# Patient Record
Sex: FEMALE | Race: BLACK OR AFRICAN AMERICAN | NOT HISPANIC OR LATINO | URBAN - METROPOLITAN AREA
[De-identification: names, ages, dates, MRNs, and addresses within clinical notes are randomized per-mention and may not be internally consistent; named-entity substitution may affect disease eponyms.]

---

## 2017-07-26 ENCOUNTER — OUTPATIENT (OUTPATIENT)
Dept: OUTPATIENT SERVICES | Facility: HOSPITAL | Age: 52
LOS: 1 days | End: 2017-07-26
Payer: COMMERCIAL

## 2017-07-26 VITALS
OXYGEN SATURATION: 98 % | TEMPERATURE: 98 F | WEIGHT: 218.92 LBS | HEIGHT: 62 IN | DIASTOLIC BLOOD PRESSURE: 80 MMHG | HEART RATE: 60 BPM | RESPIRATION RATE: 16 BRPM | SYSTOLIC BLOOD PRESSURE: 120 MMHG

## 2017-07-26 DIAGNOSIS — Z01.818 ENCOUNTER FOR OTHER PREPROCEDURAL EXAMINATION: ICD-10-CM

## 2017-07-26 DIAGNOSIS — Z98.890 OTHER SPECIFIED POSTPROCEDURAL STATES: Chronic | ICD-10-CM

## 2017-07-26 DIAGNOSIS — Z90.710 ACQUIRED ABSENCE OF BOTH CERVIX AND UTERUS: Chronic | ICD-10-CM

## 2017-07-26 DIAGNOSIS — M17.11 UNILATERAL PRIMARY OSTEOARTHRITIS, RIGHT KNEE: ICD-10-CM

## 2017-07-26 DIAGNOSIS — Z98.891 HISTORY OF UTERINE SCAR FROM PREVIOUS SURGERY: Chronic | ICD-10-CM

## 2017-07-26 LAB
ANION GAP SERPL CALC-SCNC: 9 MMOL/L — SIGNIFICANT CHANGE UP (ref 5–17)
APPEARANCE UR: CLEAR — SIGNIFICANT CHANGE UP
APTT BLD: 29.8 SEC — SIGNIFICANT CHANGE UP (ref 27.5–37.4)
BILIRUB UR-MCNC: NEGATIVE — SIGNIFICANT CHANGE UP
BUN SERPL-MCNC: 15 MG/DL — SIGNIFICANT CHANGE UP (ref 7–18)
CALCIUM SERPL-MCNC: 8.9 MG/DL — SIGNIFICANT CHANGE UP (ref 8.4–10.5)
CHLORIDE SERPL-SCNC: 101 MMOL/L — SIGNIFICANT CHANGE UP (ref 96–108)
CO2 SERPL-SCNC: 31 MMOL/L — SIGNIFICANT CHANGE UP (ref 22–31)
COLOR SPEC: YELLOW — SIGNIFICANT CHANGE UP
CREAT SERPL-MCNC: 0.83 MG/DL — SIGNIFICANT CHANGE UP (ref 0.5–1.3)
DIFF PNL FLD: NEGATIVE — SIGNIFICANT CHANGE UP
GLUCOSE SERPL-MCNC: 106 MG/DL — HIGH (ref 70–99)
GLUCOSE UR QL: NEGATIVE — SIGNIFICANT CHANGE UP
HCT VFR BLD CALC: 40.3 % — SIGNIFICANT CHANGE UP (ref 34.5–45)
HGB BLD-MCNC: 12.8 G/DL — SIGNIFICANT CHANGE UP (ref 11.5–15.5)
INR BLD: 1.04 RATIO — SIGNIFICANT CHANGE UP (ref 0.88–1.16)
KETONES UR-MCNC: NEGATIVE — SIGNIFICANT CHANGE UP
LEUKOCYTE ESTERASE UR-ACNC: ABNORMAL
MCHC RBC-ENTMCNC: 26.2 PG — LOW (ref 27–34)
MCHC RBC-ENTMCNC: 31.9 GM/DL — LOW (ref 32–36)
MCV RBC AUTO: 82.1 FL — SIGNIFICANT CHANGE UP (ref 80–100)
MRSA PCR RESULT.: SIGNIFICANT CHANGE UP
NITRITE UR-MCNC: NEGATIVE — SIGNIFICANT CHANGE UP
PH UR: 6.5 — SIGNIFICANT CHANGE UP (ref 5–8)
PLATELET # BLD AUTO: 208 K/UL — SIGNIFICANT CHANGE UP (ref 150–400)
POTASSIUM SERPL-MCNC: 3.2 MMOL/L — LOW (ref 3.5–5.3)
POTASSIUM SERPL-SCNC: 3.2 MMOL/L — LOW (ref 3.5–5.3)
PROT UR-MCNC: NEGATIVE — SIGNIFICANT CHANGE UP
PROTHROM AB SERPL-ACNC: 11.3 SEC — SIGNIFICANT CHANGE UP (ref 9.8–12.7)
RBC # BLD: 4.91 M/UL — SIGNIFICANT CHANGE UP (ref 3.8–5.2)
RBC # FLD: 13.5 % — SIGNIFICANT CHANGE UP (ref 10.3–14.5)
S AUREUS DNA NOSE QL NAA+PROBE: DETECTED
SODIUM SERPL-SCNC: 141 MMOL/L — SIGNIFICANT CHANGE UP (ref 135–145)
SP GR SPEC: 1.01 — SIGNIFICANT CHANGE UP (ref 1.01–1.02)
UROBILINOGEN FLD QL: 1
WBC # BLD: 4.7 K/UL — SIGNIFICANT CHANGE UP (ref 3.8–10.5)
WBC # FLD AUTO: 4.7 K/UL — SIGNIFICANT CHANGE UP (ref 3.8–10.5)

## 2017-07-26 PROCEDURE — 81001 URINALYSIS AUTO W/SCOPE: CPT

## 2017-07-26 PROCEDURE — 86900 BLOOD TYPING SEROLOGIC ABO: CPT

## 2017-07-26 PROCEDURE — 87640 STAPH A DNA AMP PROBE: CPT

## 2017-07-26 PROCEDURE — 86901 BLOOD TYPING SEROLOGIC RH(D): CPT

## 2017-07-26 PROCEDURE — 85610 PROTHROMBIN TIME: CPT

## 2017-07-26 PROCEDURE — 80048 BASIC METABOLIC PNL TOTAL CA: CPT

## 2017-07-26 PROCEDURE — 71046 X-RAY EXAM CHEST 2 VIEWS: CPT

## 2017-07-26 PROCEDURE — 85730 THROMBOPLASTIN TIME PARTIAL: CPT

## 2017-07-26 PROCEDURE — 93005 ELECTROCARDIOGRAM TRACING: CPT

## 2017-07-26 PROCEDURE — G0463: CPT

## 2017-07-26 PROCEDURE — 87641 MR-STAPH DNA AMP PROBE: CPT

## 2017-07-26 PROCEDURE — 85027 COMPLETE CBC AUTOMATED: CPT

## 2017-07-26 PROCEDURE — 86850 RBC ANTIBODY SCREEN: CPT

## 2017-07-26 PROCEDURE — 71020: CPT | Mod: 26

## 2017-07-26 RX ORDER — SODIUM CHLORIDE 9 MG/ML
3 INJECTION INTRAMUSCULAR; INTRAVENOUS; SUBCUTANEOUS EVERY 8 HOURS
Refills: 0 | Status: DISCONTINUED | OUTPATIENT
Start: 2017-08-07 | End: 2017-08-11

## 2017-07-26 NOTE — H&P PST ADULT - PROBLEM SELECTOR PLAN 1
Patient is scheduled for right total knee replacement on 8/7/17.Patient is at moderate risk for this surgery.

## 2017-07-26 NOTE — H&P PST ADULT - MUSCULOSKELETAL
details… detailed exam right knee/no joint erythema/no joint warmth/no calf tenderness/decreased ROM due to pain/joint swelling/diminished strength

## 2017-07-26 NOTE — H&P PST ADULT - LAB RESULTS AND INTERPRETATION
MSSA positive , Mupirocin ordered to pharmacy, pt called , pt will start August 2, 2017 afternoon in am - Surgeon, Dr. Holbrook aware , to notify  OR booking, Dr. Holbrook notify about K level 3.2, to repeat in AM of sx

## 2017-07-26 NOTE — H&P PST ADULT - HISTORY OF PRESENT ILLNESS
52 years old female presented with right knee pain, swelling, limited ROM, x 2003, s/p injury and later fall. Diagnosed with unilateral primary OA of right knee and is scheduled for right total knee replacement on 8/7/17.

## 2017-07-26 NOTE — H&P PST ADULT - PMH
Hypertension    Menorrhagia with regular cycle    Morbid obesity  BMI- 40  Primary osteoarthritis of right knee

## 2017-07-26 NOTE — H&P PST ADULT - NSANTHOSAYNRD_GEN_A_CORE
pt advised to f/u with pulmonology for assessment for LEN/No. LEN screening performed.  STOP BANG Legend: 0-2 = LOW Risk; 3-4 = INTERMEDIATE Risk; 5-8 = HIGH Risk

## 2017-08-02 RX ORDER — MUPIROCIN 20 MG/G
1 OINTMENT TOPICAL
Qty: 1 | Refills: 0
Start: 2017-08-02 | End: 2017-08-07

## 2017-08-06 ENCOUNTER — TRANSCRIPTION ENCOUNTER (OUTPATIENT)
Age: 52
End: 2017-08-06

## 2017-08-07 ENCOUNTER — INPATIENT (INPATIENT)
Facility: HOSPITAL | Age: 52
LOS: 3 days | Discharge: NURSING, REHAB OR LONG-TERM CARE | DRG: 470 | End: 2017-08-11
Attending: ORTHOPAEDIC SURGERY | Admitting: ORTHOPAEDIC SURGERY
Payer: COMMERCIAL

## 2017-08-07 VITALS
WEIGHT: 218.92 LBS | HEIGHT: 62 IN | DIASTOLIC BLOOD PRESSURE: 88 MMHG | OXYGEN SATURATION: 95 % | RESPIRATION RATE: 14 BRPM | SYSTOLIC BLOOD PRESSURE: 163 MMHG | TEMPERATURE: 98 F | HEART RATE: 69 BPM

## 2017-08-07 DIAGNOSIS — Z98.891 HISTORY OF UTERINE SCAR FROM PREVIOUS SURGERY: Chronic | ICD-10-CM

## 2017-08-07 DIAGNOSIS — Z98.890 OTHER SPECIFIED POSTPROCEDURAL STATES: Chronic | ICD-10-CM

## 2017-08-07 DIAGNOSIS — Z90.710 ACQUIRED ABSENCE OF BOTH CERVIX AND UTERUS: Chronic | ICD-10-CM

## 2017-08-07 DIAGNOSIS — M17.11 UNILATERAL PRIMARY OSTEOARTHRITIS, RIGHT KNEE: ICD-10-CM

## 2017-08-07 LAB
ANION GAP SERPL CALC-SCNC: 7 MMOL/L — SIGNIFICANT CHANGE UP (ref 5–17)
ANION GAP SERPL CALC-SCNC: 8 MMOL/L — SIGNIFICANT CHANGE UP (ref 5–17)
BUN SERPL-MCNC: 12 MG/DL — SIGNIFICANT CHANGE UP (ref 7–18)
BUN SERPL-MCNC: 14 MG/DL — SIGNIFICANT CHANGE UP (ref 7–18)
CALCIUM SERPL-MCNC: 8.5 MG/DL — SIGNIFICANT CHANGE UP (ref 8.4–10.5)
CALCIUM SERPL-MCNC: 8.9 MG/DL — SIGNIFICANT CHANGE UP (ref 8.4–10.5)
CHLORIDE SERPL-SCNC: 105 MMOL/L — SIGNIFICANT CHANGE UP (ref 96–108)
CHLORIDE SERPL-SCNC: 107 MMOL/L — SIGNIFICANT CHANGE UP (ref 96–108)
CO2 SERPL-SCNC: 27 MMOL/L — SIGNIFICANT CHANGE UP (ref 22–31)
CO2 SERPL-SCNC: 27 MMOL/L — SIGNIFICANT CHANGE UP (ref 22–31)
CREAT SERPL-MCNC: 0.85 MG/DL — SIGNIFICANT CHANGE UP (ref 0.5–1.3)
CREAT SERPL-MCNC: 0.89 MG/DL — SIGNIFICANT CHANGE UP (ref 0.5–1.3)
GLUCOSE SERPL-MCNC: 111 MG/DL — HIGH (ref 70–99)
GLUCOSE SERPL-MCNC: 134 MG/DL — HIGH (ref 70–99)
HCT VFR BLD CALC: 37.7 % — SIGNIFICANT CHANGE UP (ref 34.5–45)
HGB BLD-MCNC: 11.8 G/DL — SIGNIFICANT CHANGE UP (ref 11.5–15.5)
MCHC RBC-ENTMCNC: 26 PG — LOW (ref 27–34)
MCHC RBC-ENTMCNC: 31.3 GM/DL — LOW (ref 32–36)
MCV RBC AUTO: 83.3 FL — SIGNIFICANT CHANGE UP (ref 80–100)
PLATELET # BLD AUTO: 143 K/UL — LOW (ref 150–400)
POTASSIUM SERPL-MCNC: 3.2 MMOL/L — LOW (ref 3.5–5.3)
POTASSIUM SERPL-MCNC: 3.3 MMOL/L — LOW (ref 3.5–5.3)
POTASSIUM SERPL-SCNC: 3.2 MMOL/L — LOW (ref 3.5–5.3)
POTASSIUM SERPL-SCNC: 3.3 MMOL/L — LOW (ref 3.5–5.3)
RBC # BLD: 4.53 M/UL — SIGNIFICANT CHANGE UP (ref 3.8–5.2)
RBC # FLD: 13.9 % — SIGNIFICANT CHANGE UP (ref 10.3–14.5)
SODIUM SERPL-SCNC: 140 MMOL/L — SIGNIFICANT CHANGE UP (ref 135–145)
SODIUM SERPL-SCNC: 141 MMOL/L — SIGNIFICANT CHANGE UP (ref 135–145)
WBC # BLD: 7.2 K/UL — SIGNIFICANT CHANGE UP (ref 3.8–10.5)
WBC # FLD AUTO: 7.2 K/UL — SIGNIFICANT CHANGE UP (ref 3.8–10.5)

## 2017-08-07 PROCEDURE — 88305 TISSUE EXAM BY PATHOLOGIST: CPT | Mod: 26

## 2017-08-07 PROCEDURE — 73562 X-RAY EXAM OF KNEE 3: CPT | Mod: 26,RT

## 2017-08-07 PROCEDURE — 88311 DECALCIFY TISSUE: CPT | Mod: 26

## 2017-08-07 RX ORDER — ASPIRIN/CALCIUM CARB/MAGNESIUM 324 MG
325 TABLET ORAL
Refills: 0 | Status: DISCONTINUED | OUTPATIENT
Start: 2017-08-07 | End: 2017-08-07

## 2017-08-07 RX ORDER — OXYCODONE AND ACETAMINOPHEN 5; 325 MG/1; MG/1
1 TABLET ORAL EVERY 4 HOURS
Refills: 0 | Status: DISCONTINUED | OUTPATIENT
Start: 2017-08-07 | End: 2017-08-07

## 2017-08-07 RX ORDER — ONDANSETRON 8 MG/1
4 TABLET, FILM COATED ORAL EVERY 6 HOURS
Refills: 0 | Status: DISCONTINUED | OUTPATIENT
Start: 2017-08-07 | End: 2017-08-11

## 2017-08-07 RX ORDER — HYDROMORPHONE HYDROCHLORIDE 2 MG/ML
0.5 INJECTION INTRAMUSCULAR; INTRAVENOUS; SUBCUTANEOUS
Refills: 0 | Status: DISCONTINUED | OUTPATIENT
Start: 2017-08-07 | End: 2017-08-08

## 2017-08-07 RX ORDER — ENOXAPARIN SODIUM 100 MG/ML
30 INJECTION SUBCUTANEOUS EVERY 12 HOURS
Refills: 0 | Status: DISCONTINUED | OUTPATIENT
Start: 2017-08-07 | End: 2017-08-11

## 2017-08-07 RX ORDER — BUPIVACAINE 13.3 MG/ML
20 INJECTION, SUSPENSION, LIPOSOMAL INFILTRATION ONCE
Refills: 0 | Status: COMPLETED | OUTPATIENT
Start: 2017-08-07 | End: 2017-08-07

## 2017-08-07 RX ORDER — ASCORBIC ACID 60 MG
500 TABLET,CHEWABLE ORAL
Refills: 0 | Status: DISCONTINUED | OUTPATIENT
Start: 2017-08-07 | End: 2017-08-11

## 2017-08-07 RX ORDER — DOCUSATE SODIUM 100 MG
100 CAPSULE ORAL THREE TIMES A DAY
Refills: 0 | Status: DISCONTINUED | OUTPATIENT
Start: 2017-08-07 | End: 2017-08-11

## 2017-08-07 RX ORDER — CEFAZOLIN SODIUM 1 G
2000 VIAL (EA) INJECTION EVERY 8 HOURS
Refills: 0 | Status: COMPLETED | OUTPATIENT
Start: 2017-08-07 | End: 2017-08-07

## 2017-08-07 RX ORDER — HYDROCHLOROTHIAZIDE 25 MG
25 TABLET ORAL DAILY
Refills: 0 | Status: DISCONTINUED | OUTPATIENT
Start: 2017-08-07 | End: 2017-08-11

## 2017-08-07 RX ORDER — HYDROMORPHONE HYDROCHLORIDE 2 MG/ML
30 INJECTION INTRAMUSCULAR; INTRAVENOUS; SUBCUTANEOUS
Refills: 0 | Status: DISCONTINUED | OUTPATIENT
Start: 2017-08-07 | End: 2017-08-08

## 2017-08-07 RX ORDER — ONDANSETRON 8 MG/1
4 TABLET, FILM COATED ORAL EVERY 6 HOURS
Refills: 0 | Status: DISCONTINUED | OUTPATIENT
Start: 2017-08-07 | End: 2017-08-08

## 2017-08-07 RX ORDER — HYDROMORPHONE HYDROCHLORIDE 2 MG/ML
0.5 INJECTION INTRAMUSCULAR; INTRAVENOUS; SUBCUTANEOUS ONCE
Refills: 0 | Status: DISCONTINUED | OUTPATIENT
Start: 2017-08-07 | End: 2017-08-07

## 2017-08-07 RX ORDER — NALOXONE HYDROCHLORIDE 4 MG/.1ML
0.1 SPRAY NASAL
Refills: 0 | Status: DISCONTINUED | OUTPATIENT
Start: 2017-08-07 | End: 2017-08-08

## 2017-08-07 RX ORDER — ACETAMINOPHEN 500 MG
650 TABLET ORAL EVERY 6 HOURS
Refills: 0 | Status: DISCONTINUED | OUTPATIENT
Start: 2017-08-07 | End: 2017-08-11

## 2017-08-07 RX ORDER — POTASSIUM CHLORIDE 20 MEQ
10 PACKET (EA) ORAL
Refills: 0 | Status: COMPLETED | OUTPATIENT
Start: 2017-08-07 | End: 2017-08-07

## 2017-08-07 RX ORDER — FOLIC ACID 0.8 MG
1 TABLET ORAL DAILY
Refills: 0 | Status: DISCONTINUED | OUTPATIENT
Start: 2017-08-07 | End: 2017-08-11

## 2017-08-07 RX ORDER — FERROUS SULFATE 325(65) MG
325 TABLET ORAL
Refills: 0 | Status: DISCONTINUED | OUTPATIENT
Start: 2017-08-07 | End: 2017-08-11

## 2017-08-07 RX ORDER — LOSARTAN POTASSIUM 100 MG/1
100 TABLET, FILM COATED ORAL DAILY
Refills: 0 | Status: DISCONTINUED | OUTPATIENT
Start: 2017-08-07 | End: 2017-08-11

## 2017-08-07 RX ORDER — HYDROMORPHONE HYDROCHLORIDE 2 MG/ML
0.5 INJECTION INTRAMUSCULAR; INTRAVENOUS; SUBCUTANEOUS
Refills: 0 | Status: DISCONTINUED | OUTPATIENT
Start: 2017-08-07 | End: 2017-08-07

## 2017-08-07 RX ORDER — OXYCODONE AND ACETAMINOPHEN 5; 325 MG/1; MG/1
2 TABLET ORAL EVERY 6 HOURS
Refills: 0 | Status: DISCONTINUED | OUTPATIENT
Start: 2017-08-07 | End: 2017-08-07

## 2017-08-07 RX ADMIN — SODIUM CHLORIDE 3 MILLILITER(S): 9 INJECTION INTRAMUSCULAR; INTRAVENOUS; SUBCUTANEOUS at 21:47

## 2017-08-07 RX ADMIN — HYDROMORPHONE HYDROCHLORIDE 0.5 MILLIGRAM(S): 2 INJECTION INTRAMUSCULAR; INTRAVENOUS; SUBCUTANEOUS at 12:45

## 2017-08-07 RX ADMIN — HYDROMORPHONE HYDROCHLORIDE 0.5 MILLIGRAM(S): 2 INJECTION INTRAMUSCULAR; INTRAVENOUS; SUBCUTANEOUS at 13:35

## 2017-08-07 RX ADMIN — HYDROMORPHONE HYDROCHLORIDE 30 MILLILITER(S): 2 INJECTION INTRAMUSCULAR; INTRAVENOUS; SUBCUTANEOUS at 19:10

## 2017-08-07 RX ADMIN — SODIUM CHLORIDE 3 MILLILITER(S): 9 INJECTION INTRAMUSCULAR; INTRAVENOUS; SUBCUTANEOUS at 06:46

## 2017-08-07 RX ADMIN — HYDROMORPHONE HYDROCHLORIDE 0.5 MILLIGRAM(S): 2 INJECTION INTRAMUSCULAR; INTRAVENOUS; SUBCUTANEOUS at 12:30

## 2017-08-07 RX ADMIN — Medication 325 MILLIGRAM(S): at 18:03

## 2017-08-07 RX ADMIN — HYDROMORPHONE HYDROCHLORIDE 30 MILLILITER(S): 2 INJECTION INTRAMUSCULAR; INTRAVENOUS; SUBCUTANEOUS at 16:55

## 2017-08-07 RX ADMIN — Medication 100 MILLIGRAM(S): at 23:18

## 2017-08-07 RX ADMIN — Medication 100 MILLIGRAM(S): at 16:01

## 2017-08-07 RX ADMIN — Medication 500 MILLIGRAM(S): at 18:03

## 2017-08-07 RX ADMIN — Medication 100 MILLIEQUIVALENT(S): at 16:36

## 2017-08-07 RX ADMIN — Medication 100 MILLIGRAM(S): at 21:47

## 2017-08-07 RX ADMIN — Medication 100 MILLIEQUIVALENT(S): at 18:03

## 2017-08-07 RX ADMIN — ENOXAPARIN SODIUM 30 MILLIGRAM(S): 100 INJECTION SUBCUTANEOUS at 18:03

## 2017-08-07 RX ADMIN — HYDROMORPHONE HYDROCHLORIDE 30 MILLILITER(S): 2 INJECTION INTRAMUSCULAR; INTRAVENOUS; SUBCUTANEOUS at 14:17

## 2017-08-07 RX ADMIN — HYDROMORPHONE HYDROCHLORIDE 0.5 MILLIGRAM(S): 2 INJECTION INTRAMUSCULAR; INTRAVENOUS; SUBCUTANEOUS at 14:31

## 2017-08-07 RX ADMIN — Medication 100 MILLIEQUIVALENT(S): at 15:10

## 2017-08-07 NOTE — PHYSICAL THERAPY INITIAL EVALUATION ADULT - GENERAL OBSERVATIONS, REHAB EVAL
Consult received, chart reviewed. Patient received supine in bed, Right knee dressing c/d/i, urinary catheter and PCA pump in place. Pt's family at bedside. Patient agreed to EVALUATION from Physical Therapist.

## 2017-08-07 NOTE — PATIENT PROFILE ADULT. - FUNCTIONAL SCREEN CURRENT LEVEL: AMBULATION, MLM
Normal rate, regular rhythm.  Heart sounds S1, S2.  No murmurs, rubs or gallops. (1) assistive equipment/cane at home

## 2017-08-07 NOTE — PATIENT PROFILE ADULT. - DOES PATIENT HAVE ADVANCE DIRECTIVE
in case of emergency call patient's son Abran, he is to make medical decisions about pt, HCP  form given to pt at PST/No

## 2017-08-07 NOTE — BRIEF OPERATIVE NOTE - PROCEDURE
TKA (total knee arthroplasty)  08/07/2017  Right  Active  SPILLAI TKA (total knee arthroplasty)  08/22/2017  right  Active  SPILLAI

## 2017-08-07 NOTE — PHYSICAL THERAPY INITIAL EVALUATION ADULT - ACTIVE RANGE OF MOTION EXAMINATION, REHAB EVAL
except Right knee 0-15 degrees of flexion , ROM limited due to discomfort/no Active ROM deficits were identified

## 2017-08-07 NOTE — BRIEF OPERATIVE NOTE - PRE-OP DX
Osteoarthritis of right knee, unspecified osteoarthritis type  08/07/2017    Active  Amirah Herrera Osteoarthritis of right knee, unspecified osteoarthritis type  08/07/2017    Active  Amirah Herrera  Primary osteoarthritis of right knee  08/22/2017    Amirah Garcia

## 2017-08-08 ENCOUNTER — TRANSCRIPTION ENCOUNTER (OUTPATIENT)
Age: 52
End: 2017-08-08

## 2017-08-08 DIAGNOSIS — E66.01 MORBID (SEVERE) OBESITY DUE TO EXCESS CALORIES: ICD-10-CM

## 2017-08-08 DIAGNOSIS — R06.83 SNORING: ICD-10-CM

## 2017-08-08 DIAGNOSIS — I10 ESSENTIAL (PRIMARY) HYPERTENSION: ICD-10-CM

## 2017-08-08 DIAGNOSIS — Z98.890 OTHER SPECIFIED POSTPROCEDURAL STATES: ICD-10-CM

## 2017-08-08 DIAGNOSIS — T84.84XD PAIN DUE TO INTERNAL ORTHOPEDIC PROSTHETIC DEVICES, IMPLANTS AND GRAFTS, SUBSEQUENT ENCOUNTER: ICD-10-CM

## 2017-08-08 LAB
ANION GAP SERPL CALC-SCNC: 7 MMOL/L — SIGNIFICANT CHANGE UP (ref 5–17)
BUN SERPL-MCNC: 9 MG/DL — SIGNIFICANT CHANGE UP (ref 7–18)
CALCIUM SERPL-MCNC: 8.5 MG/DL — SIGNIFICANT CHANGE UP (ref 8.4–10.5)
CHLORIDE SERPL-SCNC: 99 MMOL/L — SIGNIFICANT CHANGE UP (ref 96–108)
CO2 SERPL-SCNC: 30 MMOL/L — SIGNIFICANT CHANGE UP (ref 22–31)
CREAT SERPL-MCNC: 0.65 MG/DL — SIGNIFICANT CHANGE UP (ref 0.5–1.3)
GLUCOSE SERPL-MCNC: 124 MG/DL — HIGH (ref 70–99)
HCT VFR BLD CALC: 30.8 % — LOW (ref 34.5–45)
HGB BLD-MCNC: 9.9 G/DL — LOW (ref 11.5–15.5)
MCHC RBC-ENTMCNC: 25.8 PG — LOW (ref 27–34)
MCHC RBC-ENTMCNC: 32.1 GM/DL — SIGNIFICANT CHANGE UP (ref 32–36)
MCV RBC AUTO: 80.3 FL — SIGNIFICANT CHANGE UP (ref 80–100)
PLATELET # BLD AUTO: 158 K/UL — SIGNIFICANT CHANGE UP (ref 150–400)
POTASSIUM SERPL-MCNC: 3.4 MMOL/L — LOW (ref 3.5–5.3)
POTASSIUM SERPL-SCNC: 3.4 MMOL/L — LOW (ref 3.5–5.3)
RBC # BLD: 3.83 M/UL — SIGNIFICANT CHANGE UP (ref 3.8–5.2)
RBC # FLD: 13.6 % — SIGNIFICANT CHANGE UP (ref 10.3–14.5)
SODIUM SERPL-SCNC: 136 MMOL/L — SIGNIFICANT CHANGE UP (ref 135–145)
WBC # BLD: 8.5 K/UL — SIGNIFICANT CHANGE UP (ref 3.8–10.5)
WBC # FLD AUTO: 8.5 K/UL — SIGNIFICANT CHANGE UP (ref 3.8–10.5)

## 2017-08-08 PROCEDURE — 99233 SBSQ HOSP IP/OBS HIGH 50: CPT

## 2017-08-08 RX ORDER — OXYCODONE AND ACETAMINOPHEN 5; 325 MG/1; MG/1
1 TABLET ORAL EVERY 4 HOURS
Refills: 0 | Status: DISCONTINUED | OUTPATIENT
Start: 2017-08-08 | End: 2017-08-08

## 2017-08-08 RX ORDER — DOCUSATE SODIUM 100 MG
1 CAPSULE ORAL
Qty: 0 | Refills: 0 | DISCHARGE
Start: 2017-08-08

## 2017-08-08 RX ORDER — FOLIC ACID 0.8 MG
1 TABLET ORAL
Qty: 0 | Refills: 0 | DISCHARGE
Start: 2017-08-08

## 2017-08-08 RX ORDER — TRAMADOL HYDROCHLORIDE 50 MG/1
50 TABLET ORAL EVERY 4 HOURS
Refills: 0 | Status: DISCONTINUED | OUTPATIENT
Start: 2017-08-08 | End: 2017-08-11

## 2017-08-08 RX ORDER — KETOROLAC TROMETHAMINE 30 MG/ML
30 SYRINGE (ML) INJECTION EVERY 6 HOURS
Refills: 0 | Status: DISCONTINUED | OUTPATIENT
Start: 2017-08-08 | End: 2017-08-11

## 2017-08-08 RX ORDER — ENOXAPARIN SODIUM 100 MG/ML
30 INJECTION SUBCUTANEOUS
Qty: 0 | Refills: 0 | DISCHARGE
Start: 2017-08-08

## 2017-08-08 RX ORDER — HYDROMORPHONE HYDROCHLORIDE 2 MG/ML
0.5 INJECTION INTRAMUSCULAR; INTRAVENOUS; SUBCUTANEOUS EVERY 4 HOURS
Refills: 0 | Status: DISCONTINUED | OUTPATIENT
Start: 2017-08-08 | End: 2017-08-11

## 2017-08-08 RX ORDER — ASCORBIC ACID 60 MG
1 TABLET,CHEWABLE ORAL
Qty: 0 | Refills: 0 | DISCHARGE
Start: 2017-08-08

## 2017-08-08 RX ORDER — OXYCODONE AND ACETAMINOPHEN 5; 325 MG/1; MG/1
2 TABLET ORAL EVERY 6 HOURS
Refills: 0 | Status: DISCONTINUED | OUTPATIENT
Start: 2017-08-08 | End: 2017-08-08

## 2017-08-08 RX ORDER — ENOXAPARIN SODIUM 100 MG/ML
40 INJECTION SUBCUTANEOUS
Qty: 0 | Refills: 0 | DISCHARGE
Start: 2017-08-08

## 2017-08-08 RX ORDER — SENNA PLUS 8.6 MG/1
1 TABLET ORAL AT BEDTIME
Refills: 0 | Status: DISCONTINUED | OUTPATIENT
Start: 2017-08-08 | End: 2017-08-11

## 2017-08-08 RX ADMIN — HYDROMORPHONE HYDROCHLORIDE 0.5 MILLIGRAM(S): 2 INJECTION INTRAMUSCULAR; INTRAVENOUS; SUBCUTANEOUS at 20:57

## 2017-08-08 RX ADMIN — HYDROMORPHONE HYDROCHLORIDE 0.5 MILLIGRAM(S): 2 INJECTION INTRAMUSCULAR; INTRAVENOUS; SUBCUTANEOUS at 20:37

## 2017-08-08 RX ADMIN — Medication 100 MILLIGRAM(S): at 06:18

## 2017-08-08 RX ADMIN — Medication 30 MILLIGRAM(S): at 09:56

## 2017-08-08 RX ADMIN — Medication 1 MILLIGRAM(S): at 12:14

## 2017-08-08 RX ADMIN — Medication 500 MILLIGRAM(S): at 18:25

## 2017-08-08 RX ADMIN — TRAMADOL HYDROCHLORIDE 50 MILLIGRAM(S): 50 TABLET ORAL at 14:41

## 2017-08-08 RX ADMIN — SENNA PLUS 1 TABLET(S): 8.6 TABLET ORAL at 21:46

## 2017-08-08 RX ADMIN — ENOXAPARIN SODIUM 30 MILLIGRAM(S): 100 INJECTION SUBCUTANEOUS at 06:18

## 2017-08-08 RX ADMIN — Medication 30 MILLIGRAM(S): at 08:56

## 2017-08-08 RX ADMIN — Medication 1 TABLET(S): at 12:14

## 2017-08-08 RX ADMIN — Medication 25 MILLIGRAM(S): at 06:18

## 2017-08-08 RX ADMIN — SODIUM CHLORIDE 3 MILLILITER(S): 9 INJECTION INTRAMUSCULAR; INTRAVENOUS; SUBCUTANEOUS at 13:37

## 2017-08-08 RX ADMIN — TRAMADOL HYDROCHLORIDE 50 MILLIGRAM(S): 50 TABLET ORAL at 15:41

## 2017-08-08 RX ADMIN — TRAMADOL HYDROCHLORIDE 50 MILLIGRAM(S): 50 TABLET ORAL at 18:25

## 2017-08-08 RX ADMIN — LOSARTAN POTASSIUM 100 MILLIGRAM(S): 100 TABLET, FILM COATED ORAL at 06:18

## 2017-08-08 RX ADMIN — SODIUM CHLORIDE 3 MILLILITER(S): 9 INJECTION INTRAMUSCULAR; INTRAVENOUS; SUBCUTANEOUS at 06:15

## 2017-08-08 RX ADMIN — ENOXAPARIN SODIUM 30 MILLIGRAM(S): 100 INJECTION SUBCUTANEOUS at 18:29

## 2017-08-08 RX ADMIN — SODIUM CHLORIDE 3 MILLILITER(S): 9 INJECTION INTRAMUSCULAR; INTRAVENOUS; SUBCUTANEOUS at 21:01

## 2017-08-08 RX ADMIN — TRAMADOL HYDROCHLORIDE 50 MILLIGRAM(S): 50 TABLET ORAL at 18:55

## 2017-08-08 RX ADMIN — Medication 325 MILLIGRAM(S): at 12:14

## 2017-08-08 RX ADMIN — Medication 325 MILLIGRAM(S): at 08:20

## 2017-08-08 RX ADMIN — TRAMADOL HYDROCHLORIDE 50 MILLIGRAM(S): 50 TABLET ORAL at 11:53

## 2017-08-08 RX ADMIN — HYDROMORPHONE HYDROCHLORIDE 30 MILLILITER(S): 2 INJECTION INTRAMUSCULAR; INTRAVENOUS; SUBCUTANEOUS at 07:10

## 2017-08-08 RX ADMIN — Medication 100 MILLIGRAM(S): at 14:42

## 2017-08-08 RX ADMIN — Medication 100 MILLIGRAM(S): at 21:46

## 2017-08-08 RX ADMIN — TRAMADOL HYDROCHLORIDE 50 MILLIGRAM(S): 50 TABLET ORAL at 10:53

## 2017-08-08 RX ADMIN — Medication 500 MILLIGRAM(S): at 06:18

## 2017-08-08 RX ADMIN — Medication 325 MILLIGRAM(S): at 18:25

## 2017-08-08 NOTE — DISCHARGE NOTE ADULT - CARE PROVIDER_API CALL
Vinicio Holbrook (DO), Orthopaedic Surgery  [address] [place] [address]  Phone: [phone]  Fax: [phone]

## 2017-08-08 NOTE — PROGRESS NOTE ADULT - SUBJECTIVE AND OBJECTIVE BOX
52yFemale    Diagnosis:  S/p Right Total Knee Replacement POD#1    Patient was seen and evaluated at bedside. Patient with no acute complaints.   Pain is poorly controlled with PCA.  Denies CP/SOB, dyspnea, paresthesias, N/V/D, palpitations.     Vital Signs Last 24 Hrs  T(C): 37 (08 Aug 2017 06:14), Max: 37 (08 Aug 2017 06:14)  T(F): 98.6 (08 Aug 2017 06:14), Max: 98.6 (08 Aug 2017 06:14)  HR: 66 (08 Aug 2017 06:14) (55 - 95)  BP: 123/68 (08 Aug 2017 06:14) (114/66 - 154/106)  BP(mean): 108 (07 Aug 2017 12:45) (88 - 135)  RR: 16 (08 Aug 2017 06:14) (13 - 20)  SpO2: 97% (08 Aug 2017 06:14) (95% - 100%)  I&O's Detail    07 Aug 2017 07:01  -  08 Aug 2017 07:00  --------------------------------------------------------  IN:  Total IN: 0 mL    OUT:    Indwelling Catheter - Urethral: 1050 mL  Total OUT: 1050 mL    Total NET: -1050 mL          Physical Exam:    General: AAOx3, NAD, resting comfortably in bed.    Right KNEE:  Dressing is C/D/I. Skin is pink and warm. Staples intact. No erythema. SILT.  Wound with no drainage, healing well.   Lower extremity:  No calf tenderness, calves are soft. 2+pulses. NVI. EHL/TA/gastrocnemius B/L intact.  Good capillary refill.                           11.8   7.2   )-----------( 143      ( 07 Aug 2017 12:38 )             37.7     08-07    141  |  107  |  12  ----------------------------<  134<H>  3.2<L>   |  27  |  0.85    Ca    8.5      07 Aug 2017 12:38        Impression:  52yFemale S/p Right Total Knee Replacement POD#1  Plan:  -  Pain management  -  Dvt prophylaxis with Lovenox  -  Daily Physical Theapy:  WBAT   -  Continue with heel bump when laying in bed  -  Discharge planning: Home Vs. Rehab pending Physical therapy eval.  -  Continue with Post-op Antibiotics x 24hrs  -  Discontinue Bingham catheter today

## 2017-08-08 NOTE — CONSULT NOTE ADULT - SUBJECTIVE AND OBJECTIVE BOX
PULMONARY CONSULT NOTE      JORDY ALEXANDER  [id number]    52 years old female with pmh of htn, menorrhagia, and morbid obesity presented with right knee pain, swelling, limited ROM, x 2003, s/p injury and later fall.   Diagnosed with unilateral primary OA of right knee and now s/p right total knee replacement on 17. no c/o cp, palp, cough, sob, fever/chills, n/v/d, headache, dizziness.  pt seen in bed a+o x3, nad. Being evaluated by pulmonology for LEN.      HISTORY OF PRESENT ILLNESS:    MEDICATIONS  (STANDING):  sodium chloride 0.9% lock flush 3 milliLiter(s) IV Push every 8 hours  losartan 100 milliGRAM(s) Oral daily  hydrochlorothiazide 25 milliGRAM(s) Oral daily  enoxaparin Injectable 30 milliGRAM(s) SubCutaneous every 12 hours  docusate sodium 100 milliGRAM(s) Oral three times a day  ferrous    sulfate 325 milliGRAM(s) Oral three times a day with meals  folic acid 1 milliGRAM(s) Oral daily  multivitamin 1 Tablet(s) Oral daily  ascorbic acid 500 milliGRAM(s) Oral two times a day      MEDICATIONS  (PRN):  acetaminophen   Tablet 650 milliGRAM(s) Oral every 6 hours PRN For Temp over 38.3 C (100.94 F)  aluminum hydroxide/magnesium hydroxide/simethicone Suspension 30 milliLiter(s) Oral four times a day PRN Indigestion  ondansetron Injectable 4 milliGRAM(s) IV Push every 6 hours PRN Nausea and/or Vomiting  oxyCODONE    5 mG/acetaminophen 325 mG 1 Tablet(s) Oral every 4 hours PRN Mild Pain (1 - 3)  oxyCODONE    5 mG/acetaminophen 325 mG 2 Tablet(s) Oral every 6 hours PRN Moderate Pain (4 - 6)  HYDROmorphone  Injectable 0.5 milliGRAM(s) IV Push every 4 hours PRN Severe Pain (7 - 10)  ketorolac   Injectable 30 milliGRAM(s) IV Push every 6 hours PRN breakthrough pain      Allergies    No Known Drug Allergies  strawberry (Rash; Hives)    Intolerances        PAST MEDICAL & SURGICAL HISTORY:  Hypertension  Primary osteoarthritis of right knee  Menorrhagia with regular cycle  Morbid obesity: BMI- 40  S/P arthroscopy of right knee: ,   S/P  section  S/P hysterectomy: 2009      FAMILY HISTORY:  Family history of breast cancer (Mother)      SOCIAL HISTORY  Smoking History:     REVIEW OF SYSTEMS:    CONSTITUTIONAL:  No fevers, chills, sweats    HEENT:  Eyes:  No diplopia or blurred vision. ENT:  No earache, sore throat or runny nose.    CARDIOVASCULAR:  No pressure, squeezing, tightness, or heaviness about the chest; no palpitations.    RESPIRATORY:  Per HPI    GASTROINTESTINAL:  No abdominal pain, nausea, vomiting or diarrhea.    GENITOURINARY:  No dysuria, frequency or urgency.    NEUROLOGIC:  No paresthesias, fasciculations, seizures or weakness.    PSYCHIATRIC:  No disorder of thought or mood.    Vital Signs Last 24 Hrs  T(C): 37 (08 Aug 2017 06:14), Max: 37 (08 Aug 2017 06:14)  T(F): 98.6 (08 Aug 2017 06:14), Max: 98.6 (08 Aug 2017 06:14)  HR: 66 (08 Aug 2017 06:14) (55 - 95)  BP: 123/68 (08 Aug 2017 06:14) (114/66 - 154/106)  BP(mean): 108 (07 Aug 2017 12:45) (88 - 135)  RR: 16 (08 Aug 2017 06:14) (13 - 20)  SpO2: 97% (08 Aug 2017 06:14) (95% - 100%)  I&O's Detail    07 Aug 2017 07:01  -  08 Aug 2017 07:00  --------------------------------------------------------  IN:  Total IN: 0 mL    OUT:    Indwelling Catheter - Urethral: 1050 mL  Total OUT: 1050 mL    Total NET: -1050 mL          PHYSICAL EXAMINATION:    GENERAL: The patient is a well-developed, well-nourished _____in no apparent distress.     HEENT: Head is normocephalic and atraumatic. Extraocular muscles are intact. Mucous membranes are moist.     NECK: Supple.     LUNGS: Clear to auscultation without wheezing, rales, or rhonchi. Respirations unlabored    HEART: Regular rate and rhythm without murmur.    ABDOMEN: Soft, nontender, and nondistended.  No hepatosplenomegaly is noted.    EXTREMITIES: Without any cyanosis, clubbing, rash, lesions or edema.    NEUROLOGIC: Grossly intact.      LABS:                        9.9    8.5   )-----------( 158      ( 08 Aug 2017 08:44 )             30.8         136  |  99  |  9   ----------------------------<  124<H>  3.4<L>   |  30  |  0.65    Ca    8.5      08 Aug 2017 08:32                          MICROBIOLOGY:    RADIOLOGY & ADDITIONAL STUDIES:    CXR:    Ct scan chest:    ekg;    echo: PULMONARY CONSULT NOTE      JORDY ALEXANDER  MRN-[id number]    52 years old female with pmh of htn, menorrhagia, and morbid obesity presented with right knee pain, swelling, limited ROM, x 2003, s/p injury and later fall.   Diagnosed with unilateral primary OA of right knee and now s/p right total knee replacement on 17. no c/o cp, palp, cough, sob, fever/chills, n/v/d, headache, dizziness.  pt seen in bed a+o x3, nad. Being evaluated by pulmonology suspected LEN      HISTORY OF PRESENT ILLNESS:    MEDICATIONS  (STANDING):  sodium chloride 0.9% lock flush 3 milliLiter(s) IV Push every 8 hours  losartan 100 milliGRAM(s) Oral daily  hydrochlorothiazide 25 milliGRAM(s) Oral daily  enoxaparin Injectable 30 milliGRAM(s) SubCutaneous every 12 hours  docusate sodium 100 milliGRAM(s) Oral three times a day  ferrous    sulfate 325 milliGRAM(s) Oral three times a day with meals  folic acid 1 milliGRAM(s) Oral daily  multivitamin 1 Tablet(s) Oral daily  ascorbic acid 500 milliGRAM(s) Oral two times a day      MEDICATIONS  (PRN):  acetaminophen   Tablet 650 milliGRAM(s) Oral every 6 hours PRN For Temp over 38.3 C (100.94 F)  aluminum hydroxide/magnesium hydroxide/simethicone Suspension 30 milliLiter(s) Oral four times a day PRN Indigestion  ondansetron Injectable 4 milliGRAM(s) IV Push every 6 hours PRN Nausea and/or Vomiting  oxyCODONE    5 mG/acetaminophen 325 mG 1 Tablet(s) Oral every 4 hours PRN Mild Pain (1 - 3)  oxyCODONE    5 mG/acetaminophen 325 mG 2 Tablet(s) Oral every 6 hours PRN Moderate Pain (4 - 6)  HYDROmorphone  Injectable 0.5 milliGRAM(s) IV Push every 4 hours PRN Severe Pain (7 - 10)  ketorolac   Injectable 30 milliGRAM(s) IV Push every 6 hours PRN breakthrough pain      Allergies    No Known Drug Allergies  strawberry (Rash; Hives)    Intolerances        PAST MEDICAL & SURGICAL HISTORY:  Hypertension  Primary osteoarthritis of right knee  Menorrhagia with regular cycle  Morbid obesity: BMI- 40  S/P arthroscopy of right knee: ,   S/P  section  S/P hysterectomy: 2009      FAMILY HISTORY:  Family history of breast cancer (Mother)      SOCIAL HISTORY  Smoking History:     REVIEW OF SYSTEMS:    CONSTITUTIONAL:  No fevers, chills, sweats    HEENT:  Eyes:  No diplopia or blurred vision. ENT:  No earache, sore throat or runny nose.    CARDIOVASCULAR:  No pressure, squeezing, tightness, or heaviness about the chest; no palpitations.    RESPIRATORY:  Per HPI    GASTROINTESTINAL:  No abdominal pain, nausea, vomiting or diarrhea.    GENITOURINARY:  No dysuria, frequency or urgency.    NEUROLOGIC:  No paresthesias, fasciculations, seizures or weakness.    PSYCHIATRIC:  No disorder of thought or mood.    Vital Signs Last 24 Hrs  T(C): 37 (08 Aug 2017 06:14), Max: 37 (08 Aug 2017 06:14)  T(F): 98.6 (08 Aug 2017 06:14), Max: 98.6 (08 Aug 2017 06:14)  HR: 66 (08 Aug 2017 06:14) (55 - 95)  BP: 123/68 (08 Aug 2017 06:14) (114/66 - 154/106)  BP(mean): 108 (07 Aug 2017 12:45) (88 - 135)  RR: 16 (08 Aug 2017 06:14) (13 - 20)  SpO2: 97% (08 Aug 2017 06:14) (95% - 100%)  I&O's Detail    07 Aug 2017 07:01  -  08 Aug 2017 07:00  --------------------------------------------------------  IN:  Total IN: 0 mL    OUT:    Indwelling Catheter - Urethral: 1050 mL  Total OUT: 1050 mL    Total NET: -1050 mL          PHYSICAL EXAMINATION:    GENERAL: The patient is a well-developed, well-nourished _____in no apparent distress.     HEENT: Head is normocephalic and atraumatic. Extraocular muscles are intact. Mucous membranes are moist.     NECK: Supple.     LUNGS: Clear to auscultation without wheezing, rales, or rhonchi. Respirations unlabored    HEART: Regular rate and rhythm without murmur.    ABDOMEN: Soft, nontender, and nondistended.  No hepatosplenomegaly is noted.    EXTREMITIES: Right knee dressing clean    NEUROLOGIC: Grossly intact.      LABS:                        9.9    8.5   )-----------( 158      ( 08 Aug 2017 08:44 )             30.8         136  |  99  |  9   ----------------------------<  124<H>  3.4<L>   |  30  |  0.65    Ca    8.5      08 Aug 2017 08:32                          MICROBIOLOGY:    RADIOLOGY & ADDITIONAL STUDIES:    CXR:    Ct scan chest:    ekg;    echo:

## 2017-08-08 NOTE — DISCHARGE NOTE ADULT - PLAN OF CARE
Right total knee replacement 1. pain management  2. DVT prophylaxis with Lovenox  -D/c Lovenox 8/22/17  3. Daily PT-WBAT  4. D/c staples 8/22/17. Staples to be removed prior to follow up with Dr. Holbrook. Apply steristrips after staples removed.  5. Follow up with Dr. Holbrook after josh removed.

## 2017-08-08 NOTE — CONSULT NOTE ADULT - PROBLEM SELECTOR RECOMMENDATION 4
Continue pain control  Ortho follow up  DVT incentive spirometry  PTx  Cont wound care Continue pain control  Ortho follow up  DVT PPX   incentive spirometry  PTx  Cont wound care  PT/Rehab

## 2017-08-08 NOTE — DISCHARGE NOTE ADULT - PATIENT PORTAL LINK FT
“You can access the Follow[facility] Patient Portal, offered by [facility], by registering with the following website: [email or web address]”

## 2017-08-08 NOTE — DISCHARGE NOTE ADULT - CARE PLAN
Principal Discharge DX:	Primary osteoarthritis of right knee  Goal:	Right total knee replacement  Instructions for follow-up, activity and diet:	1. pain management  2. DVT prophylaxis with Lovenox  -D/c Lovenox 8/22/17  3. Daily PT-WBAT  4. D/c staples 8/22/17. Staples to be removed prior to follow up with Dr. Holbrook. Apply steristrips after staples removed.  5. Follow up with Dr. Holbrook after josh removed.

## 2017-08-08 NOTE — DISCHARGE NOTE ADULT - MEDICATION SUMMARY - MEDICATIONS TO STOP TAKING
I will STOP taking the medications listed below when I get home from the hospital:    Motrin 400 mg oral tablet  -- 1 tab(s) by mouth every 6 hours, As Needed    mupirocin 2% topical ointment  -- 1 application in each nostril 2 times a day for 5 days for positive into nose swab  -- For external use only.

## 2017-08-08 NOTE — DISCHARGE NOTE ADULT - MEDICATION SUMMARY - MEDICATIONS TO TAKE
I will START or STAY ON the medications listed below when I get home from the hospital:    Percocet 5/325 oral tablet  -- 1 tab(s) by mouth every 4 hours, As Needed  -- Indication: For pain    enoxaparin  -- 30 milligram(s) subcutaneous every 12 hours  -- Indication: For dvt prophylaxis    losartan-hydrochlorothiazide 100mg-25mg oral tablet  -- 1 tab(s) by mouth once a day  -- Indication: For as before    ferrous sulfate 325 mg (65 mg elemental iron) oral tablet  -- 1 tab(s) by mouth 2 times a day  -- Indication: For anemia    docusate sodium 100 mg oral capsule  -- 1 cap(s) by mouth 3 times a day  -- Indication: For constipation    ascorbic acid 500 mg oral tablet  -- 1 tab(s) by mouth 2 times a day  -- Indication: For Supplement    folic acid 1 mg oral tablet  -- 1 tab(s) by mouth once a day  -- Indication: For Supplement I will START or STAY ON the medications listed below when I get home from the hospital:    traMADol 50 mg oral tablet  -- 1 tab(s) by mouth every 6 hours  -- Indication: For Prn pain     enoxaparin  -- 30 milligram(s) subcutaneous every 12 hours  -- Indication: For dvt prophylaxis    losartan-hydrochlorothiazide 100mg-25mg oral tablet  -- 1 tab(s) by mouth once a day  -- Indication: For as before    ferrous sulfate 325 mg (65 mg elemental iron) oral tablet  -- 1 tab(s) by mouth 2 times a day  -- Indication: For anemia    docusate sodium 100 mg oral capsule  -- 1 cap(s) by mouth 3 times a day  -- Indication: For constipation    ascorbic acid 500 mg oral tablet  -- 1 tab(s) by mouth 2 times a day  -- Indication: For Supplement    folic acid 1 mg oral tablet  -- 1 tab(s) by mouth once a day  -- Indication: For Supplement

## 2017-08-08 NOTE — CONSULT NOTE ADULT - SUBJECTIVE AND OBJECTIVE BOX
INTERVAL HPI/:    52 years old female with pmh of htn, menorrhagia, and morbid obesity presented with right knee pain, swelling, limited ROM, x 2003, s/p injury and later fall.   Diagnosed with unilateral primary OA of right knee and now s/p right total knee replacement on 17. no c/o cp, palp, cough, sob, fever/chills, n/v/d, headache, dizziness.  pt seen in bed a+o x3, nad    VITALS  T(C): 37 (17 @ 06:14), Max: 37 (17 @ 06:14)  HR: 66 (17 @ 06:14) (55 - 95)  BP: 123/68 (17 @ 06:14) (114/66 - 154/106)  RR: 16 (17 @ 06:14) (13 - 20)  SpO2: 97% (17 @ 06:14) (95% - 100%)  Wt(kg): --    PAST MEDICAL AND SURGICAL HISTORY  PAST MEDICAL & SURGICAL HISTORY:  Hypertension  Primary osteoarthritis of right knee  Menorrhagia with regular cycle  Morbid obesity: BMI- 40  S/P arthroscopy of right knee: ,   S/P  section  S/P hysterectomy:       SOCIAL HISTORY  Alcohol:  Tobacco:  Illicit substance use:      FAMILY HISTORY:      LABS:                        11.8   7.2   )-----------( 143      ( 07 Aug 2017 12:38 )             37.7     08-07    141  |  107  |  12  ----------------------------<  134<H>  3.2<L>   |  27  |  0.85    Ca    8.5      07 Aug 2017 12:38        CAPILLARY BLOOD GLUCOSE                    MEDICATIONS  (STANDING):  sodium chloride 0.9% lock flush 3 milliLiter(s) IV Push every 8 hours  losartan 100 milliGRAM(s) Oral daily  hydrochlorothiazide 25 milliGRAM(s) Oral daily  enoxaparin Injectable 30 milliGRAM(s) SubCutaneous every 12 hours  docusate sodium 100 milliGRAM(s) Oral three times a day  ferrous    sulfate 325 milliGRAM(s) Oral three times a day with meals  folic acid 1 milliGRAM(s) Oral daily  multivitamin 1 Tablet(s) Oral daily  ascorbic acid 500 milliGRAM(s) Oral two times a day  HYDROmorphone PCA (1 mG/mL) 30 milliLiter(s) PCA Continuous PCA Continuous    MEDICATIONS  (PRN):  acetaminophen   Tablet 650 milliGRAM(s) Oral every 6 hours PRN For Temp over 38.3 C (100.94 F)  aluminum hydroxide/magnesium hydroxide/simethicone Suspension 30 milliLiter(s) Oral four times a day PRN Indigestion  ondansetron Injectable 4 milliGRAM(s) IV Push every 6 hours PRN Nausea and/or Vomiting  HYDROmorphone PCA (1 mG/mL) Rescue Clinician Bolus 0.5 milliGRAM(s) IV Push every 15 minutes PRN for Pain Scale GREATER THAN 6  naloxone Injectable 0.1 milliGRAM(s) IV Push every 3 minutes PRN For ANY of the following changes in patient status:  A. RR LESS THAN 10 breaths per minute, B. Oxygen saturation LESS THAN 90%, C. Sedation score of 6  ondansetron Injectable 4 milliGRAM(s) IV Push every 6 hours PRN Nausea      REVIEW OF SYSTEMS:  CONSTITUTIONAL: No fever, weight loss, or fatigue  EYES: No eye pain, visual disturbances, or discharge  ENMT:  No difficulty hearing, tinnitus, vertigo; No sinus or throat pain  NECK: No pain or stiffness  RESPIRATORY: No cough, wheezing, chills or hemoptysis; No shortness of breath  CARDIOVASCULAR: No chest pain, palpitations, dizziness, or leg swelling  GASTROINTESTINAL: No abdominal or epigastric pain. No nausea, vomiting, or hematemesis; No diarrhea or constipation. No melena or hematochezia.  GENITOURINARY: No dysuria, frequency, hematuria, or incontinence  NEUROLOGICAL: No headaches, memory loss, loss of strength, numbness, or tremors  SKIN: No itching, burning, rashes, or lesions   LYMPH NODES: No enlarged glands  ENDOCRINE: No heat or cold intolerance; No hair loss  MUSCULOSKELETAL: No joint pain or swelling; No muscle, back, or extremity pain  PSYCHIATRIC: No depression, anxiety, mood swings, or difficulty sleeping  HEME/LYMPH: No easy bruising, or bleeding gums  ALLERY AND IMMUNOLOGIC: No hives or eczema    RADIOLOGY & ADDITIONAL TESTS:        PHYSICAL EXAM:  GENERAL: NAD, well-groomed, well-developed  HEAD:  Atraumatic, Normocephalic  EYES: EOMI, PERRLA, conjunctiva and sclera clear  ENMT: No tonsillar erythema, exudates, or enlargement; Moist mucous membranes, Good dentition, No lesions  NECK: Supple, No JVD, Normal thyroid  NERVOUS SYSTEM:  Alert & Oriented X3, Good concentration; Motor Strength 5/5 B/L upper and lower extremities; DTRs 2+ intact and symmetric  CHEST/LUNG: Clear to percussion bilaterally; No rales, rhonchi, wheezing, or rubs  HEART: Regular rate and rhythm; No murmurs, rubs, or gallops  ABDOMEN: Soft, Nontender, Nondistended; Bowel sounds present  EXTREMITIES:  2+ Peripheral Pulses, No clubbing, cyanosis, or edema, right knee dressing cdi  LYMPH: No lymphadenopathy noted  SKIN: No rashes or lesions    ASSESSMENT    Primary osteoarthritis of right knee   S/P arthroscopy of right knee  h/o Hypertension  Primary osteoarthritis of right knee  Menorrhagia with regular cycle  Morbid obesity  S/P  section  S/P hysterectomy      PLAN    s/p r tkr  cont pain control  gi/ dvt profilaxis  incentive spirometer  physical tx  cont wound care  supplement potassium for hypokalemia  cont home meds    Care Discussed with Consultants/Other Providers [x ] YES  [ ] NO

## 2017-08-09 LAB
ANION GAP SERPL CALC-SCNC: 7 MMOL/L — SIGNIFICANT CHANGE UP (ref 5–17)
BUN SERPL-MCNC: 14 MG/DL — SIGNIFICANT CHANGE UP (ref 7–18)
CALCIUM SERPL-MCNC: 8.2 MG/DL — LOW (ref 8.4–10.5)
CHLORIDE SERPL-SCNC: 98 MMOL/L — SIGNIFICANT CHANGE UP (ref 96–108)
CO2 SERPL-SCNC: 30 MMOL/L — SIGNIFICANT CHANGE UP (ref 22–31)
CREAT SERPL-MCNC: 0.72 MG/DL — SIGNIFICANT CHANGE UP (ref 0.5–1.3)
GLUCOSE SERPL-MCNC: 126 MG/DL — HIGH (ref 70–99)
HCT VFR BLD CALC: 29.5 % — LOW (ref 34.5–45)
HGB BLD-MCNC: 9.3 G/DL — LOW (ref 11.5–15.5)
MCHC RBC-ENTMCNC: 26.2 PG — LOW (ref 27–34)
MCHC RBC-ENTMCNC: 31.5 GM/DL — LOW (ref 32–36)
MCV RBC AUTO: 83.1 FL — SIGNIFICANT CHANGE UP (ref 80–100)
PLATELET # BLD AUTO: 156 K/UL — SIGNIFICANT CHANGE UP (ref 150–400)
POTASSIUM SERPL-MCNC: 3.3 MMOL/L — LOW (ref 3.5–5.3)
POTASSIUM SERPL-SCNC: 3.3 MMOL/L — LOW (ref 3.5–5.3)
RBC # BLD: 3.55 M/UL — LOW (ref 3.8–5.2)
RBC # FLD: 14.1 % — SIGNIFICANT CHANGE UP (ref 10.3–14.5)
SODIUM SERPL-SCNC: 135 MMOL/L — SIGNIFICANT CHANGE UP (ref 135–145)
SURGICAL PATHOLOGY FINAL REPORT - CH: SIGNIFICANT CHANGE UP
WBC # BLD: 9.2 K/UL — SIGNIFICANT CHANGE UP (ref 3.8–10.5)
WBC # FLD AUTO: 9.2 K/UL — SIGNIFICANT CHANGE UP (ref 3.8–10.5)

## 2017-08-09 PROCEDURE — 93971 EXTREMITY STUDY: CPT | Mod: 26,RT

## 2017-08-09 PROCEDURE — 99233 SBSQ HOSP IP/OBS HIGH 50: CPT

## 2017-08-09 RX ORDER — POTASSIUM CHLORIDE 20 MEQ
20 PACKET (EA) ORAL ONCE
Refills: 0 | Status: COMPLETED | OUTPATIENT
Start: 2017-08-09 | End: 2017-08-09

## 2017-08-09 RX ADMIN — HYDROMORPHONE HYDROCHLORIDE 0.5 MILLIGRAM(S): 2 INJECTION INTRAMUSCULAR; INTRAVENOUS; SUBCUTANEOUS at 01:59

## 2017-08-09 RX ADMIN — Medication 325 MILLIGRAM(S): at 07:46

## 2017-08-09 RX ADMIN — SODIUM CHLORIDE 3 MILLILITER(S): 9 INJECTION INTRAMUSCULAR; INTRAVENOUS; SUBCUTANEOUS at 06:52

## 2017-08-09 RX ADMIN — HYDROMORPHONE HYDROCHLORIDE 0.5 MILLIGRAM(S): 2 INJECTION INTRAMUSCULAR; INTRAVENOUS; SUBCUTANEOUS at 08:42

## 2017-08-09 RX ADMIN — TRAMADOL HYDROCHLORIDE 50 MILLIGRAM(S): 50 TABLET ORAL at 06:16

## 2017-08-09 RX ADMIN — Medication 25 MILLIGRAM(S): at 06:17

## 2017-08-09 RX ADMIN — Medication 30 MILLIGRAM(S): at 11:17

## 2017-08-09 RX ADMIN — Medication 20 MILLIEQUIVALENT(S): at 13:09

## 2017-08-09 RX ADMIN — Medication 325 MILLIGRAM(S): at 16:25

## 2017-08-09 RX ADMIN — Medication 100 MILLIGRAM(S): at 21:27

## 2017-08-09 RX ADMIN — SODIUM CHLORIDE 3 MILLILITER(S): 9 INJECTION INTRAMUSCULAR; INTRAVENOUS; SUBCUTANEOUS at 13:07

## 2017-08-09 RX ADMIN — HYDROMORPHONE HYDROCHLORIDE 0.5 MILLIGRAM(S): 2 INJECTION INTRAMUSCULAR; INTRAVENOUS; SUBCUTANEOUS at 13:58

## 2017-08-09 RX ADMIN — LOSARTAN POTASSIUM 100 MILLIGRAM(S): 100 TABLET, FILM COATED ORAL at 06:16

## 2017-08-09 RX ADMIN — Medication 1 TABLET(S): at 11:16

## 2017-08-09 RX ADMIN — HYDROMORPHONE HYDROCHLORIDE 0.5 MILLIGRAM(S): 2 INJECTION INTRAMUSCULAR; INTRAVENOUS; SUBCUTANEOUS at 09:03

## 2017-08-09 RX ADMIN — ENOXAPARIN SODIUM 30 MILLIGRAM(S): 100 INJECTION SUBCUTANEOUS at 16:24

## 2017-08-09 RX ADMIN — HYDROMORPHONE HYDROCHLORIDE 0.5 MILLIGRAM(S): 2 INJECTION INTRAMUSCULAR; INTRAVENOUS; SUBCUTANEOUS at 19:32

## 2017-08-09 RX ADMIN — Medication 500 MILLIGRAM(S): at 16:24

## 2017-08-09 RX ADMIN — HYDROMORPHONE HYDROCHLORIDE 0.5 MILLIGRAM(S): 2 INJECTION INTRAMUSCULAR; INTRAVENOUS; SUBCUTANEOUS at 19:54

## 2017-08-09 RX ADMIN — Medication 1 MILLIGRAM(S): at 11:16

## 2017-08-09 RX ADMIN — ENOXAPARIN SODIUM 30 MILLIGRAM(S): 100 INJECTION SUBCUTANEOUS at 06:17

## 2017-08-09 RX ADMIN — TRAMADOL HYDROCHLORIDE 50 MILLIGRAM(S): 50 TABLET ORAL at 06:54

## 2017-08-09 RX ADMIN — Medication 100 MILLIGRAM(S): at 11:16

## 2017-08-09 RX ADMIN — SODIUM CHLORIDE 3 MILLILITER(S): 9 INJECTION INTRAMUSCULAR; INTRAVENOUS; SUBCUTANEOUS at 21:26

## 2017-08-09 RX ADMIN — Medication 100 MILLIGRAM(S): at 06:16

## 2017-08-09 RX ADMIN — Medication 325 MILLIGRAM(S): at 11:17

## 2017-08-09 RX ADMIN — HYDROMORPHONE HYDROCHLORIDE 0.5 MILLIGRAM(S): 2 INJECTION INTRAMUSCULAR; INTRAVENOUS; SUBCUTANEOUS at 01:49

## 2017-08-09 RX ADMIN — HYDROMORPHONE HYDROCHLORIDE 0.5 MILLIGRAM(S): 2 INJECTION INTRAMUSCULAR; INTRAVENOUS; SUBCUTANEOUS at 13:33

## 2017-08-09 RX ADMIN — Medication 500 MILLIGRAM(S): at 06:16

## 2017-08-09 RX ADMIN — SENNA PLUS 1 TABLET(S): 8.6 TABLET ORAL at 21:27

## 2017-08-09 RX ADMIN — Medication 30 MILLIGRAM(S): at 11:08

## 2017-08-09 NOTE — PROGRESS NOTE ADULT - SUBJECTIVE AND OBJECTIVE BOX
Chief Complaint: right knee pain    HPI:   52y Female s/p right knee replacement, POD#2.  Pt complaining of right knee pain which increases on exertion. No nausea or vomiting. No chest pain or sob. Pt complaining of right knee pain which radiates down leg.  + edema of leg. Pt to undergo doppler of lower extremity today.          PAIN SCORE:     6/10  SCALE USED: (1-10 VNRS)    Allergies    No Known Drug Allergies  strawberry (Rash; Hives)    Intolerances      MEDICATIONS  (STANDING):  sodium chloride 0.9% lock flush 3 milliLiter(s) IV Push every 8 hours  losartan 100 milliGRAM(s) Oral daily  hydrochlorothiazide 25 milliGRAM(s) Oral daily  enoxaparin Injectable 30 milliGRAM(s) SubCutaneous every 12 hours  docusate sodium 100 milliGRAM(s) Oral three times a day  ferrous    sulfate 325 milliGRAM(s) Oral three times a day with meals  folic acid 1 milliGRAM(s) Oral daily  multivitamin 1 Tablet(s) Oral daily  ascorbic acid 500 milliGRAM(s) Oral two times a day  senna 1 Tablet(s) Oral at bedtime    MEDICATIONS  (PRN):  acetaminophen   Tablet 650 milliGRAM(s) Oral every 6 hours PRN For Temp over 38.3 C (100.94 F)  aluminum hydroxide/magnesium hydroxide/simethicone Suspension 30 milliLiter(s) Oral four times a day PRN Indigestion  ondansetron Injectable 4 milliGRAM(s) IV Push every 6 hours PRN Nausea and/or Vomiting  HYDROmorphone  Injectable 0.5 milliGRAM(s) IV Push every 4 hours PRN Severe Pain (7 - 10)  ketorolac   Injectable 30 milliGRAM(s) IV Push every 6 hours PRN breakthrough pain  traMADol 50 milliGRAM(s) Oral every 4 hours PRN Moderate Pain (4 - 6)      PHYSICAL EXAM:    Vital Signs Last 24 Hrs  T(C): 37.2 (09 Aug 2017 06:37), Max: 37.2 (09 Aug 2017 06:37)  T(F): 98.9 (09 Aug 2017 06:37), Max: 98.9 (09 Aug 2017 06:37)  HR: 74 (09 Aug 2017 06:37) (74 - 80)  BP: 109/53 (09 Aug 2017 06:37) (109/53 - 115/52)  BP(mean): --  RR: 17 (09 Aug 2017 06:37) (17 - 18)  SpO2: 98% (09 Aug 2017 06:37) (97% - 100%)             LABS:                          9.3    9.2   )-----------( 156      ( 09 Aug 2017 09:58 )             29.5     08-09    135  |  98  |  14  ----------------------------<  126<H>  3.3<L>   |  30  |  0.72    Ca    8.2<L>      09 Aug 2017 09:58            Drug Screen:        RADIOLOGY:

## 2017-08-09 NOTE — PROGRESS NOTE ADULT - SUBJECTIVE AND OBJECTIVE BOX
52yFemale    Diagnosis:  S/p R Total Knee Replacement POD# 2    Patient was seen and evaluated at bedside. Patient with no acute complaints.   Pain is  well controlled. Pt notes overall improvement in her condition today, States she has been ambulating.   Denies CP/SOB, dyspnea, paresthesias, N/V/D, palpitations.     Vital Signs Last 24 Hrs  T(C): 37.2 (09 Aug 2017 06:37), Max: 37.2 (09 Aug 2017 06:37)  T(F): 98.9 (09 Aug 2017 06:37), Max: 98.9 (09 Aug 2017 06:37)  HR: 74 (09 Aug 2017 06:37) (74 - 80)  BP: 109/53 (09 Aug 2017 06:37) (109/53 - 129/78)  BP(mean): --  RR: 17 (09 Aug 2017 06:37) (17 - 18)  SpO2: 98% (09 Aug 2017 06:37) (97% - 100%)  I&O's Detail      Physical Exam:    General: AAOx3, NAD, resting comfortably in bed.    R KNEE:  Dressing is C/D/I. Dressing changed today- Skin is pink and warm. Staples intact. No erythema. SILT.  Wound with no drainage, healing well.   Lower extremity:  (+) calf tenderness, calves are soft w/swelling noted to the knee/lower leg, 2+pulses. NVI. 5/5 Strength of EHL/TA/gastrocnemius B/L.  Good capillary refill.                           9.9    8.5   )-----------( 158      ( 08 Aug 2017 08:44 )             30.8     08-08    136  |  99  |  9   ----------------------------<  124<H>  3.4<L>   |  30  |  0.65    Ca    8.5      08 Aug 2017 08:32        Impression:  52yFemale S/p R Total Knee Replacement POD# 2  Plan:  -  Pain management  -  Dvt prophylaxis  -  Daily Physical Therapy:  WBAT  to RLE  -  Heel bump to RLE  -  Dressing changed today  -  Doppler to RLE to R/o DVT  -  F/u AM labs  -  Incentive Spirometer  -  Case d/w DR. Holbrook

## 2017-08-09 NOTE — PROGRESS NOTE ADULT - SUBJECTIVE AND OBJECTIVE BOX
Patient is a 52y old  Female who presents with a chief complaint of "  right knee replacement" (08 Aug 2017 16:47)    pt seen in icu [  ], reg med floor [x   ], bed [ x ], chair at bedside [   ], a+o x3 [ x ], lethargic [  ],  nad [x  ]    Allergies    No Known Drug Allergies  strawberry (Rash; Hives)        Vitals    T(F): 98.9 (17 @ 06:37), Max: 98.9 (17 @ 06:37)  HR: 74 (17 @ 06:37) (74 - 80)  BP: 109/53 (17 @ 06:37) (109/53 - 129/78)  RR: 17 (17 @ 06:37) (17 - 18)  SpO2: 98% (17 @ 06:37) (97% - 100%)  Wt(kg): --  CAPILLARY BLOOD GLUCOSE          Labs                          9.9    8.5   )-----------( 158      ( 08 Aug 2017 08:44 )             30.8           136  |  99  |  9   ----------------------------<  124<H>  3.4<L>   |  30  |  0.65    Ca    8.5      08 Aug 2017 08:32                  Radiology Results      Meds    MEDICATIONS  (STANDING):  sodium chloride 0.9% lock flush 3 milliLiter(s) IV Push every 8 hours  losartan 100 milliGRAM(s) Oral daily  hydrochlorothiazide 25 milliGRAM(s) Oral daily  enoxaparin Injectable 30 milliGRAM(s) SubCutaneous every 12 hours  docusate sodium 100 milliGRAM(s) Oral three times a day  ferrous    sulfate 325 milliGRAM(s) Oral three times a day with meals  folic acid 1 milliGRAM(s) Oral daily  multivitamin 1 Tablet(s) Oral daily  ascorbic acid 500 milliGRAM(s) Oral two times a day  senna 1 Tablet(s) Oral at bedtime      MEDICATIONS  (PRN):  acetaminophen   Tablet 650 milliGRAM(s) Oral every 6 hours PRN For Temp over 38.3 C (100.94 F)  aluminum hydroxide/magnesium hydroxide/simethicone Suspension 30 milliLiter(s) Oral four times a day PRN Indigestion  ondansetron Injectable 4 milliGRAM(s) IV Push every 6 hours PRN Nausea and/or Vomiting  HYDROmorphone  Injectable 0.5 milliGRAM(s) IV Push every 4 hours PRN Severe Pain (7 - 10)  ketorolac   Injectable 30 milliGRAM(s) IV Push every 6 hours PRN breakthrough pain  traMADol 50 milliGRAM(s) Oral every 4 hours PRN Moderate Pain (4 - 6)      Physical Exam    Neuro :  no focal deficits  Respiratory: CTA B/L  CV: RRR, S1S2, no murmurs,   Abdominal: Soft, NT, ND +BS,  Extremities: No edema, + peripheral pulses, right knee dressing cdi    ASSESSMENT    Primary osteoarthritis of right knee   S/P arthroscopy of right knee  postop anemia 2nd to acute blood loss  h/o Hypertension  snoring  Menorrhagia with regular cycle  Morbid obesity  S/P  section  S/P hysterectomy      PLAN    s/p r tkr  cont pain control  gi/ dvt profilaxis  incentive spirometer  physical tx  transfuse prbc as needed  cont wound care  pulm cons noted  pft and psg outpt  supplement potassium for hypokalemia  cont home meds

## 2017-08-10 LAB
ANION GAP SERPL CALC-SCNC: 8 MMOL/L — SIGNIFICANT CHANGE UP (ref 5–17)
BLD GP AB SCN SERPL QL: SIGNIFICANT CHANGE UP
BUN SERPL-MCNC: 10 MG/DL — SIGNIFICANT CHANGE UP (ref 7–18)
CALCIUM SERPL-MCNC: 8 MG/DL — LOW (ref 8.4–10.5)
CHLORIDE SERPL-SCNC: 95 MMOL/L — LOW (ref 96–108)
CO2 SERPL-SCNC: 33 MMOL/L — HIGH (ref 22–31)
CREAT SERPL-MCNC: 0.64 MG/DL — SIGNIFICANT CHANGE UP (ref 0.5–1.3)
GLUCOSE SERPL-MCNC: 118 MG/DL — HIGH (ref 70–99)
HCT VFR BLD CALC: 24.3 % — LOW (ref 34.5–45)
HCT VFR BLD CALC: 25.5 % — LOW (ref 34.5–45)
HGB BLD-MCNC: 7.7 G/DL — LOW (ref 11.5–15.5)
HGB BLD-MCNC: 8.3 G/DL — LOW (ref 11.5–15.5)
MCHC RBC-ENTMCNC: 25.6 PG — LOW (ref 27–34)
MCHC RBC-ENTMCNC: 27 PG — SIGNIFICANT CHANGE UP (ref 27–34)
MCHC RBC-ENTMCNC: 31.5 GM/DL — LOW (ref 32–36)
MCHC RBC-ENTMCNC: 32.4 GM/DL — SIGNIFICANT CHANGE UP (ref 32–36)
MCV RBC AUTO: 81.2 FL — SIGNIFICANT CHANGE UP (ref 80–100)
MCV RBC AUTO: 83.1 FL — SIGNIFICANT CHANGE UP (ref 80–100)
PLATELET # BLD AUTO: 126 K/UL — LOW (ref 150–400)
PLATELET # BLD AUTO: 128 K/UL — LOW (ref 150–400)
POTASSIUM SERPL-MCNC: 3.3 MMOL/L — LOW (ref 3.5–5.3)
POTASSIUM SERPL-SCNC: 3.3 MMOL/L — LOW (ref 3.5–5.3)
RBC # BLD: 3 M/UL — LOW (ref 3.8–5.2)
RBC # BLD: 3.06 M/UL — LOW (ref 3.8–5.2)
RBC # FLD: 13.6 % — SIGNIFICANT CHANGE UP (ref 10.3–14.5)
RBC # FLD: 13.7 % — SIGNIFICANT CHANGE UP (ref 10.3–14.5)
SODIUM SERPL-SCNC: 136 MMOL/L — SIGNIFICANT CHANGE UP (ref 135–145)
WBC # BLD: 7.1 K/UL — SIGNIFICANT CHANGE UP (ref 3.8–10.5)
WBC # BLD: 7.2 K/UL — SIGNIFICANT CHANGE UP (ref 3.8–10.5)
WBC # FLD AUTO: 7.1 K/UL — SIGNIFICANT CHANGE UP (ref 3.8–10.5)
WBC # FLD AUTO: 7.2 K/UL — SIGNIFICANT CHANGE UP (ref 3.8–10.5)

## 2017-08-10 PROCEDURE — 99233 SBSQ HOSP IP/OBS HIGH 50: CPT

## 2017-08-10 RX ORDER — POTASSIUM CHLORIDE 20 MEQ
40 PACKET (EA) ORAL EVERY 4 HOURS
Refills: 0 | Status: COMPLETED | OUTPATIENT
Start: 2017-08-10 | End: 2017-08-10

## 2017-08-10 RX ORDER — SODIUM CHLORIDE 9 MG/ML
1000 INJECTION, SOLUTION INTRAVENOUS
Refills: 0 | Status: DISCONTINUED | OUTPATIENT
Start: 2017-08-10 | End: 2017-08-11

## 2017-08-10 RX ORDER — POTASSIUM CHLORIDE 20 MEQ
40 PACKET (EA) ORAL ONCE
Refills: 0 | Status: DISCONTINUED | OUTPATIENT
Start: 2017-08-10 | End: 2017-08-10

## 2017-08-10 RX ADMIN — TRAMADOL HYDROCHLORIDE 50 MILLIGRAM(S): 50 TABLET ORAL at 22:09

## 2017-08-10 RX ADMIN — HYDROMORPHONE HYDROCHLORIDE 0.5 MILLIGRAM(S): 2 INJECTION INTRAMUSCULAR; INTRAVENOUS; SUBCUTANEOUS at 01:06

## 2017-08-10 RX ADMIN — Medication 500 MILLIGRAM(S): at 05:25

## 2017-08-10 RX ADMIN — Medication 25 MILLIGRAM(S): at 05:25

## 2017-08-10 RX ADMIN — TRAMADOL HYDROCHLORIDE 50 MILLIGRAM(S): 50 TABLET ORAL at 23:00

## 2017-08-10 RX ADMIN — Medication 325 MILLIGRAM(S): at 07:50

## 2017-08-10 RX ADMIN — Medication 100 MILLIGRAM(S): at 05:25

## 2017-08-10 RX ADMIN — ENOXAPARIN SODIUM 30 MILLIGRAM(S): 100 INJECTION SUBCUTANEOUS at 05:24

## 2017-08-10 RX ADMIN — Medication 100 MILLIGRAM(S): at 13:23

## 2017-08-10 RX ADMIN — SODIUM CHLORIDE 3 MILLILITER(S): 9 INJECTION INTRAMUSCULAR; INTRAVENOUS; SUBCUTANEOUS at 22:05

## 2017-08-10 RX ADMIN — Medication 1 TABLET(S): at 11:28

## 2017-08-10 RX ADMIN — HYDROMORPHONE HYDROCHLORIDE 0.5 MILLIGRAM(S): 2 INJECTION INTRAMUSCULAR; INTRAVENOUS; SUBCUTANEOUS at 00:40

## 2017-08-10 RX ADMIN — TRAMADOL HYDROCHLORIDE 50 MILLIGRAM(S): 50 TABLET ORAL at 15:41

## 2017-08-10 RX ADMIN — Medication 30 MILLIGRAM(S): at 11:44

## 2017-08-10 RX ADMIN — Medication 30 MILLIGRAM(S): at 17:32

## 2017-08-10 RX ADMIN — Medication 30 MILLIGRAM(S): at 18:20

## 2017-08-10 RX ADMIN — HYDROMORPHONE HYDROCHLORIDE 0.5 MILLIGRAM(S): 2 INJECTION INTRAMUSCULAR; INTRAVENOUS; SUBCUTANEOUS at 05:40

## 2017-08-10 RX ADMIN — LOSARTAN POTASSIUM 100 MILLIGRAM(S): 100 TABLET, FILM COATED ORAL at 05:25

## 2017-08-10 RX ADMIN — ENOXAPARIN SODIUM 30 MILLIGRAM(S): 100 INJECTION SUBCUTANEOUS at 17:21

## 2017-08-10 RX ADMIN — Medication 325 MILLIGRAM(S): at 17:21

## 2017-08-10 RX ADMIN — Medication 325 MILLIGRAM(S): at 11:28

## 2017-08-10 RX ADMIN — Medication 40 MILLIEQUIVALENT(S): at 10:44

## 2017-08-10 RX ADMIN — TRAMADOL HYDROCHLORIDE 50 MILLIGRAM(S): 50 TABLET ORAL at 08:50

## 2017-08-10 RX ADMIN — SODIUM CHLORIDE 3 MILLILITER(S): 9 INJECTION INTRAMUSCULAR; INTRAVENOUS; SUBCUTANEOUS at 05:25

## 2017-08-10 RX ADMIN — HYDROMORPHONE HYDROCHLORIDE 0.5 MILLIGRAM(S): 2 INJECTION INTRAMUSCULAR; INTRAVENOUS; SUBCUTANEOUS at 05:25

## 2017-08-10 RX ADMIN — TRAMADOL HYDROCHLORIDE 50 MILLIGRAM(S): 50 TABLET ORAL at 07:50

## 2017-08-10 RX ADMIN — SODIUM CHLORIDE 3 MILLILITER(S): 9 INJECTION INTRAMUSCULAR; INTRAVENOUS; SUBCUTANEOUS at 13:17

## 2017-08-10 RX ADMIN — Medication 500 MILLIGRAM(S): at 17:21

## 2017-08-10 RX ADMIN — TRAMADOL HYDROCHLORIDE 50 MILLIGRAM(S): 50 TABLET ORAL at 16:41

## 2017-08-10 RX ADMIN — Medication 40 MILLIEQUIVALENT(S): at 13:22

## 2017-08-10 RX ADMIN — Medication 30 MILLIGRAM(S): at 10:44

## 2017-08-10 RX ADMIN — Medication 1 MILLIGRAM(S): at 11:28

## 2017-08-10 NOTE — PROGRESS NOTE ADULT - SUBJECTIVE AND OBJECTIVE BOX
Chief Complaint: right knee pain    HPI:   52y Female s/p right knee replacement POD#2.  Pt complaining of right knee pain.  No nausea or vomiting.  No chest pain or sob.  Lower extremity dopplers from yesterday - neg.  Pt ambulating around unit with walker.         PAIN SCORE:     5/10    SCALE USED: (1-10 VNRS)    Allergies    No Known Drug Allergies  strawberry (Rash; Hives)    Intolerances      MEDICATIONS  (STANDING):  sodium chloride 0.9% lock flush 3 milliLiter(s) IV Push every 8 hours  losartan 100 milliGRAM(s) Oral daily  hydrochlorothiazide 25 milliGRAM(s) Oral daily  enoxaparin Injectable 30 milliGRAM(s) SubCutaneous every 12 hours  docusate sodium 100 milliGRAM(s) Oral three times a day  ferrous    sulfate 325 milliGRAM(s) Oral three times a day with meals  folic acid 1 milliGRAM(s) Oral daily  multivitamin 1 Tablet(s) Oral daily  ascorbic acid 500 milliGRAM(s) Oral two times a day  senna 1 Tablet(s) Oral at bedtime  dextrose 5% + sodium chloride 0.9%. 1000 milliLiter(s) (100 mL/Hr) IV Continuous <Continuous>    MEDICATIONS  (PRN):  acetaminophen   Tablet 650 milliGRAM(s) Oral every 6 hours PRN For Temp over 38.3 C (100.94 F)  aluminum hydroxide/magnesium hydroxide/simethicone Suspension 30 milliLiter(s) Oral four times a day PRN Indigestion  ondansetron Injectable 4 milliGRAM(s) IV Push every 6 hours PRN Nausea and/or Vomiting  HYDROmorphone  Injectable 0.5 milliGRAM(s) IV Push every 4 hours PRN Severe Pain (7 - 10)  ketorolac   Injectable 30 milliGRAM(s) IV Push every 6 hours PRN breakthrough pain  traMADol 50 milliGRAM(s) Oral every 4 hours PRN Moderate Pain (4 - 6)      PHYSICAL EXAM:    Vital Signs Last 24 Hrs  T(C): 37.2 (10 Aug 2017 13:04), Max: 37.4 (09 Aug 2017 22:12)  T(F): 99 (10 Aug 2017 13:04), Max: 99.4 (09 Aug 2017 22:12)  HR: 69 (10 Aug 2017 13:04) (67 - 77)  BP: 118/40 (10 Aug 2017 13:04) (113/56 - 136/77)  BP(mean): --  RR: 16 (10 Aug 2017 13:04) (16 - 18)  SpO2: 100% (10 Aug 2017 13:04) (97% - 100%)             LABS:                          8.3    7.2   )-----------( 128      ( 10 Aug 2017 08:50 )             25.5     08-10    136  |  95<L>  |  10  ----------------------------<  118<H>  3.3<L>   |  33<H>  |  0.64    Ca    8.0<L>      10 Aug 2017 07:03            Drug Screen:        RADIOLOGY:  < from: US Duplex Venous Lower Ext Ltd, Right (08.09.17 @ 15:52) >  EXAM:  US DPLX LWR EXT VEINS LTD RT                            PROCEDURE DATE:  08/09/2017          INTERPRETATION:  Venous duplex Doppler ultrasound of the right leg    Clinical Indication: Right leg swelling.    Findings: Venous duplex Doppler ultrasound with gray scale, color and   spectral Doppler analysis of the right leg is performed. The right common   femoral vein, superficial femoral vein, profunda femoral vein, popliteal   vein , and posterior tibial vein demonstrate compressibility and/or   duplex Doppler patency.    Impression: No evidence for deep vein thrombosis.     < end of copied text >

## 2017-08-10 NOTE — PROGRESS NOTE ADULT - SUBJECTIVE AND OBJECTIVE BOX
Patient is a 52y old  Female who presents with a chief complaint of "  right knee replacement" (08 Aug 2017 16:47)    pt seen in icu [  ], reg med floor [x   ], bed [ x ], chair at bedside [   ], a+o x3 [ x ], lethargic [  ],  nad [x  ]      Allergies    No Known Drug Allergies  strawberry (Rash; Hives)        Vitals    T(F): 98.2 (08-10-17 @ 06:23), Max: 99.4 (17 @ 22:12)  HR: 70 (08-10-17 @ 10:46) (67 - 77)  BP: 113/56 (08-10-17 @ 10:46) (113/56 - 136/77)  RR: 18 (08-10-17 @ 06:23) (16 - 18)  SpO2: 100% (08-10-17 @ 10:46) (97% - 100%)  Wt(kg): --  CAPILLARY BLOOD GLUCOSE        bs                          8.3    7.2   )-----------( 128      ( 10 Aug 2017 08:50 )             25.5       08-10    136  |  95<L>  |  10  ----------------------------<  118<H>  3.3<L>   |  33<H>  |  0.64    Ca    8.0<L>      10 Aug 2017 07:03        Radiology Results      < from: US Duplex Venous Lower Ext Ltd, Right (17 @ 15:52) >  Impression: No evidence for deep vein thrombosis.     < end of copied text >        Meds    MEDICATIONS  (STANDING):  sodium chloride 0.9% lock flush 3 milliLiter(s) IV Push every 8 hours  losartan 100 milliGRAM(s) Oral daily  hydrochlorothiazide 25 milliGRAM(s) Oral daily  enoxaparin Injectable 30 milliGRAM(s) SubCutaneous every 12 hours  docusate sodium 100 milliGRAM(s) Oral three times a day  ferrous    sulfate 325 milliGRAM(s) Oral three times a day with meals  folic acid 1 milliGRAM(s) Oral daily  multivitamin 1 Tablet(s) Oral daily  ascorbic acid 500 milliGRAM(s) Oral two times a day  senna 1 Tablet(s) Oral at bedtime  dextrose 5% + sodium chloride 0.9%. 1000 milliLiter(s) (100 mL/Hr) IV Continuous <Continuous>  potassium chloride    Tablet ER 40 milliEquivalent(s) Oral every 4 hours      MEDICATIONS  (PRN):  acetaminophen   Tablet 650 milliGRAM(s) Oral every 6 hours PRN For Temp over 38.3 C (100.94 F)  aluminum hydroxide/magnesium hydroxide/simethicone Suspension 30 milliLiter(s) Oral four times a day PRN Indigestion  ondansetron Injectable 4 milliGRAM(s) IV Push every 6 hours PRN Nausea and/or Vomiting  HYDROmorphone  Injectable 0.5 milliGRAM(s) IV Push every 4 hours PRN Severe Pain (7 - 10)  ketorolac   Injectable 30 milliGRAM(s) IV Push every 6 hours PRN breakthrough pain  traMADol 50 milliGRAM(s) Oral every 4 hours PRN Moderate Pain (4 - 6)      Physical Exam    Neuro :  no focal deficits  Respiratory: CTA B/L  CV: RRR, S1S2, no murmurs,   Abdominal: Soft, NT, ND +BS,  Extremities: mild right leg edema, + peripheral pulses, right knee dressing cdi    ASSESSMENT    Primary osteoarthritis of right knee   S/P arthroscopy of right knee  postop anemia 2nd to acute blood loss  h/o Hypertension  snoring  Menorrhagia with regular cycle  Morbid obesity  S/P  section  S/P hysterectomy      PLAN    s/p r tkr  cont pain control  gi/ dvt profilaxis  incentive spirometer  physical tx  transfuse prbc as needed  cont wound care  right le doppler neg for dvt noted above  pulm cons noted  pft and psg outpt  supplement potassium for hypokalemia  cont home meds

## 2017-08-10 NOTE — PROGRESS NOTE ADULT - SUBJECTIVE AND OBJECTIVE BOX
52yFemale    Diagnosis:  S/p Right Total Knee Replacement POD#3    Patient was seen and evaluated at bedside. Patient with no acute complaints.   Pain is  well controlled.  Awaiting PT for ambulation.     Denies CP/SOB, dyspnea, paresthesias, N/V/D, palpitations, headaches, dizziness.    Vital Signs Last 24 Hrs  T(C): 36.8 (10 Aug 2017 06:23), Max: 37.4 (09 Aug 2017 22:12)  T(F): 98.2 (10 Aug 2017 06:23), Max: 99.4 (09 Aug 2017 22:12)  HR: 77 (10 Aug 2017 06:23) (67 - 77)  BP: 136/77 (10 Aug 2017 06:23) (118/48 - 136/77)  BP(mean): --  RR: 18 (10 Aug 2017 06:23) (16 - 18)  SpO2: 98% (10 Aug 2017 06:23) (97% - 98%)  I&O's Detail      Physical Exam:    General: AAOx3, NAD, resting comfortably in bed.    Right knee:  Dressing removed-> Wound is clean, dry, with staples intact. No erythema. Skin is pink and warm. SILT.  No drainage.   Lower extremities:  No calf tenderness, calves are soft. 2+pulses. NVI. 5/5 Strength of EHL/TA/gastrocnemius B/L.  Good capillary refill. SILT.                          8.3    7.2   )-----------( 128      ( 10 Aug 2017 08:50 )             25.5     08-10    136  |  95<L>  |  10  ----------------------------<  118<H>  3.3<L>   |  33<H>  |  0.64    Ca    8.0<L>      10 Aug 2017 07:03        Impression:  52yFemale S/p Right Total Knee Replacement POD#3 with Hypokalemia  Plan:  -  Pain management  -  Dvt prophylaxis with Lovenox  -  Daily Physical Therapy:  WBAT of the right lower extremity with walker  -  Discharge planning: Home Vs. Rehab pending Physical therapy eval.  -  Case d/w Dr. Holbrook  -  Hypokalemia 40meq now PO [id number] x 2 doses.   -  Dressing changed today, new dressing applied.  -  Incentive spirometry encouraged. 52yFemale    Diagnosis:  S/p Right Total Knee Replacement POD#3    Patient was seen and evaluated at bedside. Patient with no acute complaints.   Pain is  well controlled.  Awaiting PT for ambulation.     Denies CP/SOB, dyspnea, paresthesias, N/V/D, palpitations, headaches, dizziness.    Vital Signs Last 24 Hrs  T(C): 36.8 (10 Aug 2017 06:23), Max: 37.4 (09 Aug 2017 22:12)  T(F): 98.2 (10 Aug 2017 06:23), Max: 99.4 (09 Aug 2017 22:12)  HR: 77 (10 Aug 2017 06:23) (67 - 77)  BP: 136/77 (10 Aug 2017 06:23) (118/48 - 136/77)  BP(mean): --  RR: 18 (10 Aug 2017 06:23) (16 - 18)  SpO2: 98% (10 Aug 2017 06:23) (97% - 98%)  I&O's Detail      Physical Exam:    General: AAOx3, NAD, resting comfortably in bed.    Right knee:  Dressing removed-> Wound is clean, dry, with staples intact. No erythema. Skin is pink and warm. SILT.  No drainage.   Lower extremities:  No calf tenderness, calves are soft. 2+pulses. NVI. 5/5 Strength of EHL/TA/gastrocnemius B/L.  Good capillary refill. SILT.                          8.3    7.2   )-----------( 128      ( 10 Aug 2017 08:50 )             25.5     08-10    136  |  95<L>  |  10  ----------------------------<  118<H>  3.3<L>   |  33<H>  |  0.64    Ca    8.0<L>      10 Aug 2017 07:03        Impression:  52yFemale S/p Right Total Knee Replacement POD#3 with Hypokalemia  Plan:  -  Pain management  -  Dvt prophylaxis with Lovenox  -  Daily Physical Therapy:  WBAT of the right lower extremity with walker  -  Discharge planning: Home Vs. Rehab pending Physical therapy eval.  -  Case d/w Dr. Holbrook  -  Hypokalemia 40meq now PO [id number] x 2 doses.   -  Dressing changed today, new dressing applied.  -  Incentive spirometry encouraged.   -  Pt is asymptomatic Acute blood loss anemia, no need for pRBC transfusion. Not indicated.

## 2017-08-11 VITALS — SYSTOLIC BLOOD PRESSURE: 131 MMHG | DIASTOLIC BLOOD PRESSURE: 54 MMHG | OXYGEN SATURATION: 100 % | HEART RATE: 85 BPM

## 2017-08-11 PROCEDURE — 97163 PT EVAL HIGH COMPLEX 45 MIN: CPT

## 2017-08-11 PROCEDURE — 80048 BASIC METABOLIC PNL TOTAL CA: CPT

## 2017-08-11 PROCEDURE — C1713: CPT

## 2017-08-11 PROCEDURE — 97110 THERAPEUTIC EXERCISES: CPT

## 2017-08-11 PROCEDURE — 99232 SBSQ HOSP IP/OBS MODERATE 35: CPT

## 2017-08-11 PROCEDURE — 88305 TISSUE EXAM BY PATHOLOGIST: CPT

## 2017-08-11 PROCEDURE — 86901 BLOOD TYPING SEROLOGIC RH(D): CPT

## 2017-08-11 PROCEDURE — 97116 GAIT TRAINING THERAPY: CPT

## 2017-08-11 PROCEDURE — 97530 THERAPEUTIC ACTIVITIES: CPT

## 2017-08-11 PROCEDURE — C1776: CPT

## 2017-08-11 PROCEDURE — 73562 X-RAY EXAM OF KNEE 3: CPT

## 2017-08-11 PROCEDURE — 85027 COMPLETE CBC AUTOMATED: CPT

## 2017-08-11 PROCEDURE — 88311 DECALCIFY TISSUE: CPT

## 2017-08-11 PROCEDURE — 93971 EXTREMITY STUDY: CPT

## 2017-08-11 PROCEDURE — 86850 RBC ANTIBODY SCREEN: CPT

## 2017-08-11 PROCEDURE — 86900 BLOOD TYPING SEROLOGIC ABO: CPT

## 2017-08-11 PROCEDURE — 36415 COLL VENOUS BLD VENIPUNCTURE: CPT

## 2017-08-11 RX ADMIN — LOSARTAN POTASSIUM 100 MILLIGRAM(S): 100 TABLET, FILM COATED ORAL at 05:27

## 2017-08-11 RX ADMIN — TRAMADOL HYDROCHLORIDE 50 MILLIGRAM(S): 50 TABLET ORAL at 13:01

## 2017-08-11 RX ADMIN — Medication 25 MILLIGRAM(S): at 05:27

## 2017-08-11 RX ADMIN — Medication 325 MILLIGRAM(S): at 11:31

## 2017-08-11 RX ADMIN — Medication 1 MILLIGRAM(S): at 11:31

## 2017-08-11 RX ADMIN — ENOXAPARIN SODIUM 30 MILLIGRAM(S): 100 INJECTION SUBCUTANEOUS at 05:27

## 2017-08-11 RX ADMIN — TRAMADOL HYDROCHLORIDE 50 MILLIGRAM(S): 50 TABLET ORAL at 13:37

## 2017-08-11 RX ADMIN — Medication 500 MILLIGRAM(S): at 17:09

## 2017-08-11 RX ADMIN — SODIUM CHLORIDE 3 MILLILITER(S): 9 INJECTION INTRAMUSCULAR; INTRAVENOUS; SUBCUTANEOUS at 05:40

## 2017-08-11 RX ADMIN — Medication 325 MILLIGRAM(S): at 07:26

## 2017-08-11 RX ADMIN — ENOXAPARIN SODIUM 30 MILLIGRAM(S): 100 INJECTION SUBCUTANEOUS at 17:09

## 2017-08-11 RX ADMIN — Medication 1 TABLET(S): at 11:31

## 2017-08-11 RX ADMIN — Medication 325 MILLIGRAM(S): at 17:09

## 2017-08-11 RX ADMIN — TRAMADOL HYDROCHLORIDE 50 MILLIGRAM(S): 50 TABLET ORAL at 05:27

## 2017-08-11 RX ADMIN — Medication 500 MILLIGRAM(S): at 05:27

## 2017-08-11 RX ADMIN — TRAMADOL HYDROCHLORIDE 50 MILLIGRAM(S): 50 TABLET ORAL at 06:44

## 2017-08-11 NOTE — PROGRESS NOTE ADULT - MS GEN HX ROS MEA POS PC
arthralgia/arthritis/joint swelling/joint pain
arthritis/joint swelling/joint pain
arthritis/joint swelling/joint pain
arthritis/joint swelling/joint pain/leg pain R

## 2017-08-11 NOTE — PROGRESS NOTE ADULT - RESPIRATORY
detailed exam
Breath Sounds equal & clear to percussion & auscultation, no accessory muscle use
detailed exam
detailed exam

## 2017-08-11 NOTE — PROGRESS NOTE ADULT - NEUROLOGICAL DETAILS
alert and oriented x 3/responds to pain
alert and oriented x 3/responds to pain/responds to verbal commands
alert and oriented x 3/responds to pain/responds to verbal commands
alert and oriented x 3/responds to pain

## 2017-08-11 NOTE — PROGRESS NOTE ADULT - SUBJECTIVE AND OBJECTIVE BOX
52yFemale    Diagnosis:  S/p R Total Knee Replacement POD#4    Patient was seen and evaluated at bedside. Patient with no acute complaints.   Pain is  well controlled.  Denies CP/SOB, dyspnea, paresthesias, N/V/D, palpitations.     Vital Signs Last 24 Hrs  T(C): 37.6 (11 Aug 2017 06:37), Max: 37.6 (11 Aug 2017 06:37)  T(F): 99.6 (11 Aug 2017 06:37), Max: 99.6 (11 Aug 2017 06:37)  HR: 73 (11 Aug 2017 06:37) (69 - 73)  BP: 124/40 (11 Aug 2017 06:37) (113/56 - 124/48)  BP(mean): --  RR: 18 (11 Aug 2017 06:37) (16 - 18)  SpO2: 100% (11 Aug 2017 06:37) (98% - 100%)  I&O's Detail      Physical Exam:    General: AAOx3, NAD, resting comfortably in bed.    R KNEE:  Dressing is C/D/I. Skin is pink and warm. Staples intact. No erythema. SILT.  Wound with no drainage, healing well.   Lower extremity:  No calf tenderness, calves are soft. 2+pulses. NVI. 5/5 Strength of EHL/TA/gastrocnemius B/L.  Good capillary refill.                           8.3    7.2   )-----------( 128      ( 10 Aug 2017 08:50 )             25.5     08-10    136  |  95<L>  |  10  ----------------------------<  118<H>  3.3<L>   |  33<H>  |  0.64    Ca    8.0<L>      10 Aug 2017 07:03        Impression:  52yFemale S/p R Total Knee Replacement POD#4  Plan:  -  Pain management  -  Dvt prophylaxis with Lovenox/venodynes  -  Daily Physical Theapy:  WBAT  -  Continue with heel bump when laying in bed  -  Discharge planning: Rehab

## 2017-08-11 NOTE — PROGRESS NOTE ADULT - SUBJECTIVE AND OBJECTIVE BOX
Patient is a 52y old  Female who presents with a chief complaint of "  right knee replacement" (08 Aug 2017 16:47)    pt seen in icu [  ], reg med floor [x   ], bed [ x ], chair at bedside [   ], a+o x3 [ x ], lethargic [  ],  nad [x  ]        Allergies    No Known Drug Allergies  strawberry (Rash; Hives)        Vitals    T(F): 99.6 (17 @ 06:37), Max: 99.6 (17 @ 06:37)  HR: 73 (17 @ 06:37) (69 - 73)  BP: 124/40 (17 @ 06:37) (113/56 - 124/48)  RR: 18 (17 @ 06:37) (16 - 18)  SpO2: 100% (17 @ 06:37) (98% - 100%)  Wt(kg): --  CAPILLARY BLOOD GLUCOSE          Labs                          8.3    7.2   )-----------( 128      ( 10 Aug 2017 08:50 )             25.5       08-10    136  |  95<L>  |  10  ----------------------------<  118<H>  3.3<L>   |  33<H>  |  0.64    Ca    8.0<L>      10 Aug 2017 07:03                  Radiology Results      Meds    MEDICATIONS  (STANDING):  sodium chloride 0.9% lock flush 3 milliLiter(s) IV Push every 8 hours  losartan 100 milliGRAM(s) Oral daily  hydrochlorothiazide 25 milliGRAM(s) Oral daily  enoxaparin Injectable 30 milliGRAM(s) SubCutaneous every 12 hours  docusate sodium 100 milliGRAM(s) Oral three times a day  ferrous    sulfate 325 milliGRAM(s) Oral three times a day with meals  folic acid 1 milliGRAM(s) Oral daily  multivitamin 1 Tablet(s) Oral daily  ascorbic acid 500 milliGRAM(s) Oral two times a day  senna 1 Tablet(s) Oral at bedtime  dextrose 5% + sodium chloride 0.9%. 1000 milliLiter(s) (100 mL/Hr) IV Continuous <Continuous>      MEDICATIONS  (PRN):  acetaminophen   Tablet 650 milliGRAM(s) Oral every 6 hours PRN For Temp over 38.3 C (100.94 F)  aluminum hydroxide/magnesium hydroxide/simethicone Suspension 30 milliLiter(s) Oral four times a day PRN Indigestion  ondansetron Injectable 4 milliGRAM(s) IV Push every 6 hours PRN Nausea and/or Vomiting  HYDROmorphone  Injectable 0.5 milliGRAM(s) IV Push every 4 hours PRN Severe Pain (7 - 10)  ketorolac   Injectable 30 milliGRAM(s) IV Push every 6 hours PRN breakthrough pain  traMADol 50 milliGRAM(s) Oral every 4 hours PRN Moderate Pain (4 - 6)      Physical Exam    Neuro :  no focal deficits  Respiratory: CTA B/L  CV: RRR, S1S2, no murmurs,   Abdominal: Soft, NT, ND +BS,  Extremities: mild right leg edema, + peripheral pulses, right knee dressing cdi    ASSESSMENT    Primary osteoarthritis of right knee   S/P arthroscopy of right knee  postop anemia 2nd to acute blood loss  h/o Hypertension  snoring  Menorrhagia with regular cycle  Morbid obesity  S/P  section  S/P hysterectomy      PLAN    s/p r tkr  cont pain control  gi/ dvt profilaxis  incentive spirometer  physical tx  transfuse prbc as needed  cont wound care  right le doppler neg for dvt noted  pulm cons noted  pft and psg outpt  supplement potassium for hypokalemia  cont home meds  d/c plan as per ortho Patient is a 52y old  Female who presents with a chief complaint of "  right knee replacement" (08 Aug 2017 16:47)    pt seen in icu [  ], reg med floor [x   ], bed [  ], chair at bedside [ x ], a+o x3 [ x ], lethargic [  ],  nad [x  ]        Allergies    No Known Drug Allergies  strawberry (Rash; Hives)        Vitals    T(F): 99.6 (17 @ 06:37), Max: 99.6 (17 @ 06:37)  HR: 73 (17 @ 06:37) (69 - 73)  BP: 124/40 (17 @ 06:37) (113/56 - 124/48)  RR: 18 (17 @ 06:37) (16 - 18)  SpO2: 100% (17 @ 06:37) (98% - 100%)  Wt(kg): --  CAPILLARY BLOOD GLUCOSE          Labs                          8.3    7.2   )-----------( 128      ( 10 Aug 2017 08:50 )             25.5       08-10    136  |  95<L>  |  10  ----------------------------<  118<H>  3.3<L>   |  33<H>  |  0.64    Ca    8.0<L>      10 Aug 2017 07:03                  Radiology Results      Meds    MEDICATIONS  (STANDING):  sodium chloride 0.9% lock flush 3 milliLiter(s) IV Push every 8 hours  losartan 100 milliGRAM(s) Oral daily  hydrochlorothiazide 25 milliGRAM(s) Oral daily  enoxaparin Injectable 30 milliGRAM(s) SubCutaneous every 12 hours  docusate sodium 100 milliGRAM(s) Oral three times a day  ferrous    sulfate 325 milliGRAM(s) Oral three times a day with meals  folic acid 1 milliGRAM(s) Oral daily  multivitamin 1 Tablet(s) Oral daily  ascorbic acid 500 milliGRAM(s) Oral two times a day  senna 1 Tablet(s) Oral at bedtime  dextrose 5% + sodium chloride 0.9%. 1000 milliLiter(s) (100 mL/Hr) IV Continuous <Continuous>      MEDICATIONS  (PRN):  acetaminophen   Tablet 650 milliGRAM(s) Oral every 6 hours PRN For Temp over 38.3 C (100.94 F)  aluminum hydroxide/magnesium hydroxide/simethicone Suspension 30 milliLiter(s) Oral four times a day PRN Indigestion  ondansetron Injectable 4 milliGRAM(s) IV Push every 6 hours PRN Nausea and/or Vomiting  HYDROmorphone  Injectable 0.5 milliGRAM(s) IV Push every 4 hours PRN Severe Pain (7 - 10)  ketorolac   Injectable 30 milliGRAM(s) IV Push every 6 hours PRN breakthrough pain  traMADol 50 milliGRAM(s) Oral every 4 hours PRN Moderate Pain (4 - 6)      Physical Exam    Neuro :  no focal deficits  Respiratory: CTA B/L  CV: RRR, S1S2, no murmurs,   Abdominal: Soft, NT, ND +BS,  Extremities: mild right leg edema, + peripheral pulses, right knee dressing cdi, right medial knee hematoma and medial thigh ecchimosis    ASSESSMENT    Primary osteoarthritis of right knee   S/P arthroscopy of right knee  postop anemia 2nd to acute blood loss  h/o Hypertension  snoring  Menorrhagia with regular cycle  Morbid obesity  S/P  section  S/P hysterectomy      PLAN    s/p r tkr  cont pain control  gi/ dvt profilaxis  incentive spirometer  physical tx  transfuse prbc as needed  cont wound care  right le doppler neg for dvt noted  pulm cons noted  pft and psg outpt  supplement potassium for hypokalemia  cont home meds  d/c plan as per ortho

## 2017-08-11 NOTE — PROGRESS NOTE ADULT - RS GEN PE MLT RESP DETAILS PC
no rales/no rhonchi/no wheezes
normal/no rales/no rhonchi/no wheezes
normal/no rales/no rhonchi/no wheezes

## 2017-08-11 NOTE — PROGRESS NOTE ADULT - GASTROINTESTINAL DETAILS
normal/soft/nontender/no distention
soft/nontender/bowel sounds normal
soft/nontender/bowel sounds normal

## 2017-08-11 NOTE — PROGRESS NOTE ADULT - SUBJECTIVE AND OBJECTIVE BOX
Chief Complaint: right knee pain    HPI:   52y Female s/p right knee replacement, POD#3. Pt oob and ambulating on own.  Right knee pain increases on exertion.  No nausea or vomiting.  No chest pain or sob.  Pt for possible dc today.        PAIN SCORE:    4/10     SCALE USED: (1-10 VNRS)    Allergies    No Known Drug Allergies  strawberry (Rash; Hives)    Intolerances      MEDICATIONS  (STANDING):  sodium chloride 0.9% lock flush 3 milliLiter(s) IV Push every 8 hours  losartan 100 milliGRAM(s) Oral daily  hydrochlorothiazide 25 milliGRAM(s) Oral daily  enoxaparin Injectable 30 milliGRAM(s) SubCutaneous every 12 hours  docusate sodium 100 milliGRAM(s) Oral three times a day  ferrous    sulfate 325 milliGRAM(s) Oral three times a day with meals  folic acid 1 milliGRAM(s) Oral daily  multivitamin 1 Tablet(s) Oral daily  ascorbic acid 500 milliGRAM(s) Oral two times a day  senna 1 Tablet(s) Oral at bedtime  dextrose 5% + sodium chloride 0.9%. 1000 milliLiter(s) (100 mL/Hr) IV Continuous <Continuous>    MEDICATIONS  (PRN):  acetaminophen   Tablet 650 milliGRAM(s) Oral every 6 hours PRN For Temp over 38.3 C (100.94 F)  aluminum hydroxide/magnesium hydroxide/simethicone Suspension 30 milliLiter(s) Oral four times a day PRN Indigestion  ondansetron Injectable 4 milliGRAM(s) IV Push every 6 hours PRN Nausea and/or Vomiting  HYDROmorphone  Injectable 0.5 milliGRAM(s) IV Push every 4 hours PRN Severe Pain (7 - 10)  ketorolac   Injectable 30 milliGRAM(s) IV Push every 6 hours PRN breakthrough pain  traMADol 50 milliGRAM(s) Oral every 4 hours PRN Moderate Pain (4 - 6)      PHYSICAL EXAM:    Vital Signs Last 24 Hrs  T(C): 37.6 (11 Aug 2017 06:37), Max: 37.6 (11 Aug 2017 06:37)  T(F): 99.6 (11 Aug 2017 06:37), Max: 99.6 (11 Aug 2017 06:37)  HR: 73 (11 Aug 2017 06:37) (69 - 73)  BP: 124/40 (11 Aug 2017 06:37) (118/40 - 124/48)  BP(mean): --  RR: 18 (11 Aug 2017 06:37) (16 - 18)  SpO2: 100% (11 Aug 2017 06:37) (98% - 100%)             LABS:                          8.3    7.2   )-----------( 128      ( 10 Aug 2017 08:50 )             25.5     08-10    136  |  95<L>  |  10  ----------------------------<  118<H>  3.3<L>   |  33<H>  |  0.64    Ca    8.0<L>      10 Aug 2017 07:03            Drug Screen:        RADIOLOGY:

## 2017-08-11 NOTE — PROGRESS NOTE ADULT - PROBLEM SELECTOR PROBLEM 2
Hypertension
S/P right knee surgery

## 2017-08-11 NOTE — PROGRESS NOTE ADULT - PROBLEM SELECTOR PROBLEM 1
Primary osteoarthritis of right knee
Pain due to total right knee replacement, subsequent encounter

## 2017-08-11 NOTE — PROGRESS NOTE ADULT - PROBLEM SELECTOR PLAN 1
S/p R Total Knee Replacement POD# 2  Pain control  Doppler to RLE to R/o DVT  Dressing changed today
- pca d/c  - toradol 30mg ivp q 6 hours prn  - tramadol 50mg po q 4 hours prn.  Pt states that percocet makes her very nauseous.   - stool softeners  - iv dilaudid prn severe pain only  - ice to knee
- toradol 30mg ivp q 6 hours prn while in hospital  - tramadol 50mg po q 4 hours prn.  Pt states that percocet makes her very nauseous.   - stool softeners  - d/c iv dilaudid  - ice to knee  - oob/pt
- toradol 30mg ivp q 6 hours prn  - tramadol 50mg po q 4 hours prn.  Pt states that percocet makes her very nauseous.   - stool softeners  - d/c iv dilaudid  - ice to knee  - oob/pt
- toradol 30mg ivp q 6 hours prn  - tramadol 50mg po q 4 hours prn.  Pt states that percocet makes her very nauseous.   - stool softeners  - iv dilaudid prn severe pain only  - ice to knee  - pending dopplers le

## 2017-08-15 DIAGNOSIS — M21.161 VARUS DEFORMITY, NOT ELSEWHERE CLASSIFIED, RIGHT KNEE: ICD-10-CM

## 2017-08-15 DIAGNOSIS — I10 ESSENTIAL (PRIMARY) HYPERTENSION: ICD-10-CM

## 2017-08-15 DIAGNOSIS — E87.6 HYPOKALEMIA: ICD-10-CM

## 2017-08-15 DIAGNOSIS — Z90.710 ACQUIRED ABSENCE OF BOTH CERVIX AND UTERUS: ICD-10-CM

## 2017-08-15 DIAGNOSIS — D62 ACUTE POSTHEMORRHAGIC ANEMIA: ICD-10-CM

## 2017-08-15 DIAGNOSIS — Z91.018 ALLERGY TO OTHER FOODS: ICD-10-CM

## 2017-08-15 DIAGNOSIS — E66.01 MORBID (SEVERE) OBESITY DUE TO EXCESS CALORIES: ICD-10-CM

## 2017-08-15 DIAGNOSIS — M17.11 UNILATERAL PRIMARY OSTEOARTHRITIS, RIGHT KNEE: ICD-10-CM

## 2017-08-25 ENCOUNTER — INPATIENT (INPATIENT)
Facility: HOSPITAL | Age: 52
LOS: 6 days | Discharge: NURSING, REHAB OR LONG-TERM CARE | DRG: 556 | End: 2017-09-01
Attending: INTERNAL MEDICINE | Admitting: INTERNAL MEDICINE
Payer: COMMERCIAL

## 2017-08-25 VITALS
SYSTOLIC BLOOD PRESSURE: 131 MMHG | HEIGHT: 62 IN | RESPIRATION RATE: 19 BRPM | DIASTOLIC BLOOD PRESSURE: 83 MMHG | WEIGHT: 223.99 LBS | OXYGEN SATURATION: 100 % | HEART RATE: 84 BPM | TEMPERATURE: 99 F

## 2017-08-25 DIAGNOSIS — Z98.890 OTHER SPECIFIED POSTPROCEDURAL STATES: Chronic | ICD-10-CM

## 2017-08-25 DIAGNOSIS — Z98.891 HISTORY OF UTERINE SCAR FROM PREVIOUS SURGERY: Chronic | ICD-10-CM

## 2017-08-25 DIAGNOSIS — Z90.710 ACQUIRED ABSENCE OF BOTH CERVIX AND UTERUS: Chronic | ICD-10-CM

## 2017-08-25 DIAGNOSIS — M79.661 PAIN IN RIGHT LOWER LEG: ICD-10-CM

## 2017-08-25 DIAGNOSIS — Z96.651 PRESENCE OF RIGHT ARTIFICIAL KNEE JOINT: Chronic | ICD-10-CM

## 2017-08-25 LAB
ALBUMIN SERPL ELPH-MCNC: 3.3 G/DL — LOW (ref 3.5–5)
ALP SERPL-CCNC: 80 U/L — SIGNIFICANT CHANGE UP (ref 40–120)
ALT FLD-CCNC: 28 U/L DA — SIGNIFICANT CHANGE UP (ref 10–60)
ANION GAP SERPL CALC-SCNC: 7 MMOL/L — SIGNIFICANT CHANGE UP (ref 5–17)
AST SERPL-CCNC: 25 U/L — SIGNIFICANT CHANGE UP (ref 10–40)
BILIRUB SERPL-MCNC: 0.6 MG/DL — SIGNIFICANT CHANGE UP (ref 0.2–1.2)
BUN SERPL-MCNC: 10 MG/DL — SIGNIFICANT CHANGE UP (ref 7–18)
CALCIUM SERPL-MCNC: 9.7 MG/DL — SIGNIFICANT CHANGE UP (ref 8.4–10.5)
CHLORIDE SERPL-SCNC: 102 MMOL/L — SIGNIFICANT CHANGE UP (ref 96–108)
CK SERPL-CCNC: 82 U/L — SIGNIFICANT CHANGE UP (ref 21–215)
CO2 SERPL-SCNC: 30 MMOL/L — SIGNIFICANT CHANGE UP (ref 22–31)
CREAT SERPL-MCNC: 0.86 MG/DL — SIGNIFICANT CHANGE UP (ref 0.5–1.3)
D DIMER BLD IA.RAPID-MCNC: 3340 NG/ML DDU — HIGH
GLUCOSE SERPL-MCNC: 142 MG/DL — HIGH (ref 70–99)
HCT VFR BLD CALC: 34.4 % — LOW (ref 34.5–45)
HGB BLD-MCNC: 10.7 G/DL — LOW (ref 11.5–15.5)
MCHC RBC-ENTMCNC: 26.4 PG — LOW (ref 27–34)
MCHC RBC-ENTMCNC: 31 GM/DL — LOW (ref 32–36)
MCV RBC AUTO: 85.1 FL — SIGNIFICANT CHANGE UP (ref 80–100)
PLATELET # BLD AUTO: 266 K/UL — SIGNIFICANT CHANGE UP (ref 150–400)
POTASSIUM SERPL-MCNC: 3.3 MMOL/L — LOW (ref 3.5–5.3)
POTASSIUM SERPL-SCNC: 3.3 MMOL/L — LOW (ref 3.5–5.3)
PROT SERPL-MCNC: 8.1 G/DL — SIGNIFICANT CHANGE UP (ref 6–8.3)
RBC # BLD: 4.04 M/UL — SIGNIFICANT CHANGE UP (ref 3.8–5.2)
RBC # FLD: 16.2 % — HIGH (ref 10.3–14.5)
SODIUM SERPL-SCNC: 139 MMOL/L — SIGNIFICANT CHANGE UP (ref 135–145)
WBC # BLD: 4.6 K/UL — SIGNIFICANT CHANGE UP (ref 3.8–10.5)
WBC # FLD AUTO: 4.6 K/UL — SIGNIFICANT CHANGE UP (ref 3.8–10.5)

## 2017-08-25 PROCEDURE — 99284 EMERGENCY DEPT VISIT MOD MDM: CPT

## 2017-08-25 RX ORDER — ENOXAPARIN SODIUM 100 MG/ML
100 INJECTION SUBCUTANEOUS
Refills: 0 | Status: DISCONTINUED | OUTPATIENT
Start: 2017-08-25 | End: 2017-08-26

## 2017-08-25 RX ORDER — KETOROLAC TROMETHAMINE 30 MG/ML
30 SYRINGE (ML) INJECTION ONCE
Refills: 0 | Status: DISCONTINUED | OUTPATIENT
Start: 2017-08-25 | End: 2017-08-25

## 2017-08-25 RX ORDER — HYDROMORPHONE HYDROCHLORIDE 2 MG/ML
0.5 INJECTION INTRAMUSCULAR; INTRAVENOUS; SUBCUTANEOUS ONCE
Refills: 0 | Status: DISCONTINUED | OUTPATIENT
Start: 2017-08-25 | End: 2017-08-26

## 2017-08-25 RX ADMIN — Medication 30 MILLIGRAM(S): at 22:45

## 2017-08-25 NOTE — ED PROVIDER NOTE - SKIN, MLM
Skin normal color for race, warm, dry and intact. No evidence of rash. Skin normal color for race, warm, dry and intact. No evidence of rash. right calf ttp

## 2017-08-25 NOTE — ED PROVIDER NOTE - OBJECTIVE STATEMENT
53 y/o F pt with PMHx of HTN, Menorrhagia (with Regular Cycle), Morbid Obesity, and Primary OA (of R knee) and PSHx of Arthroscopy of R Knee, , Hysterectomy, and TKR (R knee; performed by Dr. Holbrook at [facility] recently) presents to ED c/o R calf pain since yesterday. Pt states she was undergoing physical therapy yesterday when she suddenly began experiencing R calf pain; pt notes difficulty ambulating secondary to pain. Pt denies any other complaints. Allergies: Percocet 5/325 (hives, rash).

## 2017-08-25 NOTE — ED PROVIDER NOTE - CONDUCTED A DETAILED DISCUSSION WITH PATIENT AND/OR GUARDIAN REGARDING, MDM
lab results/radiology results/need for outpatient follow-up lab results/need to admit/return to ED if symptoms worsen, persist or questions arise

## 2017-08-25 NOTE — ED PROVIDER NOTE - MEDICAL DECISION MAKING DETAILS
admit to rule out dvt (doppler in am), pain control  discussed with Dr. Wheeler (ortho), rec adm to medicine for further mgmt

## 2017-08-25 NOTE — ED PROVIDER NOTE - PSH
S/P arthroscopy of right knee  ,   S/P  section    S/P hysterectomy    S/P TKR (total knee replacement), right

## 2017-08-26 DIAGNOSIS — I10 ESSENTIAL (PRIMARY) HYPERTENSION: ICD-10-CM

## 2017-08-26 DIAGNOSIS — I82.401 ACUTE EMBOLISM AND THROMBOSIS OF UNSPECIFIED DEEP VEINS OF RIGHT LOWER EXTREMITY: ICD-10-CM

## 2017-08-26 DIAGNOSIS — Z29.9 ENCOUNTER FOR PROPHYLACTIC MEASURES, UNSPECIFIED: ICD-10-CM

## 2017-08-26 DIAGNOSIS — D64.9 ANEMIA, UNSPECIFIED: ICD-10-CM

## 2017-08-26 LAB
24R-OH-CALCIDIOL SERPL-MCNC: 20.2 NG/ML — LOW (ref 30–100)
ANION GAP SERPL CALC-SCNC: 7 MMOL/L — SIGNIFICANT CHANGE UP (ref 5–17)
BUN SERPL-MCNC: 13 MG/DL — SIGNIFICANT CHANGE UP (ref 7–18)
CALCIUM SERPL-MCNC: 9.7 MG/DL — SIGNIFICANT CHANGE UP (ref 8.4–10.5)
CHLORIDE SERPL-SCNC: 103 MMOL/L — SIGNIFICANT CHANGE UP (ref 96–108)
CHOLEST SERPL-MCNC: 179 MG/DL — SIGNIFICANT CHANGE UP (ref 10–199)
CO2 SERPL-SCNC: 28 MMOL/L — SIGNIFICANT CHANGE UP (ref 22–31)
CREAT SERPL-MCNC: 0.89 MG/DL — SIGNIFICANT CHANGE UP (ref 0.5–1.3)
FOLATE SERPL-MCNC: >20 NG/ML — SIGNIFICANT CHANGE UP (ref 4.8–24.2)
GLUCOSE SERPL-MCNC: 105 MG/DL — HIGH (ref 70–99)
HBA1C BLD-MCNC: 5.7 % — HIGH (ref 4–5.6)
HCT VFR BLD CALC: 34.1 % — LOW (ref 34.5–45)
HCV AB S/CO SERPL IA: 0.08 S/CO — SIGNIFICANT CHANGE UP
HCV AB SERPL-IMP: SIGNIFICANT CHANGE UP
HDLC SERPL-MCNC: 35 MG/DL — LOW (ref 40–125)
HGB BLD-MCNC: 10.3 G/DL — LOW (ref 11.5–15.5)
LIPID PNL WITH DIRECT LDL SERPL: 120 MG/DL — SIGNIFICANT CHANGE UP
MAGNESIUM SERPL-MCNC: 2 MG/DL — SIGNIFICANT CHANGE UP (ref 1.6–2.6)
MCHC RBC-ENTMCNC: 25.6 PG — LOW (ref 27–34)
MCHC RBC-ENTMCNC: 30.3 GM/DL — LOW (ref 32–36)
MCV RBC AUTO: 84.3 FL — SIGNIFICANT CHANGE UP (ref 80–100)
PHOSPHATE SERPL-MCNC: 4 MG/DL — SIGNIFICANT CHANGE UP (ref 2.5–4.5)
PLATELET # BLD AUTO: 276 K/UL — SIGNIFICANT CHANGE UP (ref 150–400)
POTASSIUM SERPL-MCNC: 3.9 MMOL/L — SIGNIFICANT CHANGE UP (ref 3.5–5.3)
POTASSIUM SERPL-SCNC: 3.9 MMOL/L — SIGNIFICANT CHANGE UP (ref 3.5–5.3)
RBC # BLD: 4.04 M/UL — SIGNIFICANT CHANGE UP (ref 3.8–5.2)
RBC # FLD: 16.2 % — HIGH (ref 10.3–14.5)
SODIUM SERPL-SCNC: 138 MMOL/L — SIGNIFICANT CHANGE UP (ref 135–145)
TOTAL CHOLESTEROL/HDL RATIO MEASUREMENT: 5.1 RATIO — SIGNIFICANT CHANGE UP (ref 3.3–7.1)
TRIGL SERPL-MCNC: 120 MG/DL — SIGNIFICANT CHANGE UP (ref 10–149)
TSH SERPL-MCNC: 4.13 UU/ML — SIGNIFICANT CHANGE UP (ref 0.34–4.82)
VIT B12 SERPL-MCNC: 848 PG/ML — SIGNIFICANT CHANGE UP (ref 243–894)
WBC # BLD: 5.4 K/UL — SIGNIFICANT CHANGE UP (ref 3.8–10.5)
WBC # FLD AUTO: 5.4 K/UL — SIGNIFICANT CHANGE UP (ref 3.8–10.5)

## 2017-08-26 PROCEDURE — 93970 EXTREMITY STUDY: CPT | Mod: 26

## 2017-08-26 PROCEDURE — 73700 CT LOWER EXTREMITY W/O DYE: CPT | Mod: 26,RT

## 2017-08-26 RX ORDER — TRAMADOL HYDROCHLORIDE 50 MG/1
50 TABLET ORAL EVERY 6 HOURS
Refills: 0 | Status: DISCONTINUED | OUTPATIENT
Start: 2017-08-26 | End: 2017-09-01

## 2017-08-26 RX ORDER — SENNA PLUS 8.6 MG/1
2 TABLET ORAL AT BEDTIME
Refills: 0 | Status: DISCONTINUED | OUTPATIENT
Start: 2017-08-26 | End: 2017-09-01

## 2017-08-26 RX ORDER — HYDROMORPHONE HYDROCHLORIDE 2 MG/ML
1 INJECTION INTRAMUSCULAR; INTRAVENOUS; SUBCUTANEOUS EVERY 8 HOURS
Refills: 0 | Status: DISCONTINUED | OUTPATIENT
Start: 2017-08-26 | End: 2017-09-01

## 2017-08-26 RX ORDER — DOCUSATE SODIUM 100 MG
100 CAPSULE ORAL DAILY
Refills: 0 | Status: DISCONTINUED | OUTPATIENT
Start: 2017-08-26 | End: 2017-09-01

## 2017-08-26 RX ORDER — POTASSIUM CHLORIDE 20 MEQ
40 PACKET (EA) ORAL ONCE
Refills: 0 | Status: COMPLETED | OUTPATIENT
Start: 2017-08-26 | End: 2017-08-26

## 2017-08-26 RX ORDER — ACETAMINOPHEN 500 MG
650 TABLET ORAL EVERY 6 HOURS
Refills: 0 | Status: DISCONTINUED | OUTPATIENT
Start: 2017-08-26 | End: 2017-09-01

## 2017-08-26 RX ORDER — FOLIC ACID 0.8 MG
1 TABLET ORAL DAILY
Refills: 0 | Status: DISCONTINUED | OUTPATIENT
Start: 2017-08-26 | End: 2017-09-01

## 2017-08-26 RX ORDER — LOSARTAN POTASSIUM 100 MG/1
100 TABLET, FILM COATED ORAL DAILY
Refills: 0 | Status: DISCONTINUED | OUTPATIENT
Start: 2017-08-26 | End: 2017-09-01

## 2017-08-26 RX ORDER — HYDROCHLOROTHIAZIDE 25 MG
25 TABLET ORAL DAILY
Refills: 0 | Status: DISCONTINUED | OUTPATIENT
Start: 2017-08-26 | End: 2017-09-01

## 2017-08-26 RX ORDER — ENOXAPARIN SODIUM 100 MG/ML
40 INJECTION SUBCUTANEOUS
Refills: 0 | Status: DISCONTINUED | OUTPATIENT
Start: 2017-08-26 | End: 2017-09-01

## 2017-08-26 RX ORDER — FERROUS SULFATE 325(65) MG
325 TABLET ORAL DAILY
Refills: 0 | Status: DISCONTINUED | OUTPATIENT
Start: 2017-08-26 | End: 2017-09-01

## 2017-08-26 RX ORDER — ASCORBIC ACID 60 MG
500 TABLET,CHEWABLE ORAL DAILY
Refills: 0 | Status: DISCONTINUED | OUTPATIENT
Start: 2017-08-26 | End: 2017-09-01

## 2017-08-26 RX ADMIN — Medication 40 MILLIEQUIVALENT(S): at 03:18

## 2017-08-26 RX ADMIN — Medication 100 MILLIGRAM(S): at 12:58

## 2017-08-26 RX ADMIN — Medication 325 MILLIGRAM(S): at 12:58

## 2017-08-26 RX ADMIN — SENNA PLUS 2 TABLET(S): 8.6 TABLET ORAL at 22:52

## 2017-08-26 RX ADMIN — TRAMADOL HYDROCHLORIDE 50 MILLIGRAM(S): 50 TABLET ORAL at 13:23

## 2017-08-26 RX ADMIN — Medication 500 MILLIGRAM(S): at 12:58

## 2017-08-26 RX ADMIN — TRAMADOL HYDROCHLORIDE 50 MILLIGRAM(S): 50 TABLET ORAL at 18:47

## 2017-08-26 RX ADMIN — HYDROMORPHONE HYDROCHLORIDE 1 MILLIGRAM(S): 2 INJECTION INTRAMUSCULAR; INTRAVENOUS; SUBCUTANEOUS at 03:47

## 2017-08-26 RX ADMIN — Medication 30 MILLIGRAM(S): at 00:26

## 2017-08-26 RX ADMIN — HYDROMORPHONE HYDROCHLORIDE 0.5 MILLIGRAM(S): 2 INJECTION INTRAMUSCULAR; INTRAVENOUS; SUBCUTANEOUS at 00:26

## 2017-08-26 RX ADMIN — HYDROMORPHONE HYDROCHLORIDE 0.5 MILLIGRAM(S): 2 INJECTION INTRAMUSCULAR; INTRAVENOUS; SUBCUTANEOUS at 01:21

## 2017-08-26 RX ADMIN — HYDROMORPHONE HYDROCHLORIDE 1 MILLIGRAM(S): 2 INJECTION INTRAMUSCULAR; INTRAVENOUS; SUBCUTANEOUS at 04:03

## 2017-08-26 RX ADMIN — HYDROMORPHONE HYDROCHLORIDE 1 MILLIGRAM(S): 2 INJECTION INTRAMUSCULAR; INTRAVENOUS; SUBCUTANEOUS at 18:47

## 2017-08-26 RX ADMIN — ENOXAPARIN SODIUM 100 MILLIGRAM(S): 100 INJECTION SUBCUTANEOUS at 00:27

## 2017-08-26 RX ADMIN — HYDROMORPHONE HYDROCHLORIDE 1 MILLIGRAM(S): 2 INJECTION INTRAMUSCULAR; INTRAVENOUS; SUBCUTANEOUS at 15:56

## 2017-08-26 RX ADMIN — ENOXAPARIN SODIUM 100 MILLIGRAM(S): 100 INJECTION SUBCUTANEOUS at 10:25

## 2017-08-26 RX ADMIN — ENOXAPARIN SODIUM 40 MILLIGRAM(S): 100 INJECTION SUBCUTANEOUS at 18:08

## 2017-08-26 RX ADMIN — Medication 1 MILLIGRAM(S): at 12:58

## 2017-08-26 NOTE — H&P ADULT - ASSESSMENT
52F currently at Rehab after total right knee replacement, mostly wheelchair bound at the moment PMH of HTN, anemia and mech fall that resulted in damage to right knee presents to hospital for right calf pain. Presenting symptoms and recent history of surgery and relative immobility likely resulted in a DVT despite dvt prophylaxis received at rehab

## 2017-08-26 NOTE — ED ADULT NURSE NOTE - ED STAT RN HANDOFF DETAILS
pt.remained  stable. denies  pain. transfer to  431 report given to terry rn.not  in distress pt.remained  stable. denies  pain. transfer to rm 401 B report given to harrison rn.not  in distress

## 2017-08-26 NOTE — H&P ADULT - NSHPPHYSICALEXAM_GEN_ALL_CORE
Vital Signs Last 24 Hrs  T(C): 37.2 (26 Aug 2017 00:35), Max: 37.4 (25 Aug 2017 20:43)  T(F): 98.9 (26 Aug 2017 00:35), Max: 99.4 (25 Aug 2017 20:43)  HR: 70 (26 Aug 2017 00:35) (67 - 84)  BP: 115/57 (26 Aug 2017 00:35) (115/57 - 141/71)  BP(mean): --  RR: 18 (26 Aug 2017 00:35) (18 - 19)  SpO2: 98% (26 Aug 2017 00:35) (98% - 100%)    GENERAL: NAD, well-groomed, well-developed, obese lady  HEAD:  Atraumatic, Normocephalic  EYES: EOMI, PERRLA, conjunctiva and sclera clear  ENMT: No tonsillar erythema, exudates, or enlargement; Moist mucous membranes  NECK: Supple, No JVD, Normal thyroid  NERVOUS SYSTEM:  Alert & Oriented X3  CHEST/LUNG: Clear to auscultation bilaterally; No rales, rhonchi, wheezing, or rubs  HEART: Regular rate and rhythm; No murmurs, rubs, or gallops  ABDOMEN: Soft, Nontender, Nondistended; Bowel sounds present  EXTREMITIES:  2+ Peripheral Pulses, right calf is tender to palpation and feels swollen compared to left, no erythema noted  LYMPH: No lymphadenopathy noted  SKIN: No rashes or lesions

## 2017-08-26 NOTE — ED ADULT NURSE NOTE - OBJECTIVE STATEMENT
presented to ed with  Rt calf pain; difficulty ambulating secondary to pain.bld.drawn and sent to lab

## 2017-08-26 NOTE — H&P ADULT - PROBLEM SELECTOR PLAN 1
Patient has suspected DVT of right leg, much less likely is cellulitis  -will treat for now with lovenox full dose anticoagulation (100mg q12h)  -will get US doppler of right extremity in AM when available  -PE much less likely as patient has excellent saturation and doesn't have any complaints of chest pain or shortness of breath  -will put on strict bedrest  -pain control for dvt and the knee surgery with tylenol for mild pain, tramadol for moderate pain and dilaudid for severe pain

## 2017-08-26 NOTE — CHART NOTE - NSCHARTNOTEFT_GEN_A_CORE
Doppler US lower extremities negative for DVT. DC full dose anticoagulation with lovenox 100mg. Started prophylactic dose 40 mg BID. Discussed with RN and PGY3 Dr Schwab.

## 2017-08-26 NOTE — H&P ADULT - ATTENDING COMMENTS
52F currently at Rehab after total right knee replacement, mostly wheelchair bound at the moment PMH of HTN, anemia and mech fall that resulted in damage to right knee presents to hospital for right calf pain. Patient was doing physical therapy yesterday and then went to take a nap. When she woke up, she had calf pain and it was so bad she couldn't walk. She had venous doppler done on 8/16/17 which was negative for DVT and was on lovenox 40mg q12h. She called her doctor and asked to be evaluated so patient was transferred to hospital for further care.     pt seen in bed, vitals stable, physical exam reveals lungs cta b/l, heart s1s2, abd soft nd nt bs+, ext right lower extrem edema, right knee wound . labs and diagnostic test result reviewed.    assessment  --  s/p right tkr, r/o dvt, r/o hematoma, h/o HTN, anemia     plan  --  admit to medicine, cont preadmit home meds, pain control gi and dvt profilaxis,  cbc, bmp, mg, phos, lipids, tsh,     ct right le    venous doppler right le    ortho cons  phys tx

## 2017-08-26 NOTE — H&P ADULT - PROBLEM SELECTOR PLAN 3
Patient is on iron therapy and H/H is steadily improving  -will continue on therapy  -c/w stool softeners as patient complains of constipation

## 2017-08-26 NOTE — H&P ADULT - HISTORY OF PRESENT ILLNESS
52F currently at Rehab after total right knee replacement, mostly wheelchair bound at the moment PMH of HTN, anemia and mech fall that resulted in damage to right knee presents to hospital for right calf pain. Patient was doing physical therapy yesterday and then went to take a nap. When she woke up, she had calf pain and it was so bad she couldn't walk. She had venous doppler done on 8/16/17 which was negative for DVT and was on lovenox 40mg q12h. She called her doctor and asked to be evaluated so patient was transferred to hospital for further care.

## 2017-08-26 NOTE — H&P ADULT - NSHPREVIEWOFSYSTEMS_GEN_ALL_CORE
REVIEW OF SYSTEMS:  CONSTITUTIONAL: No fever, weight loss, or fatigue  EYES: No eye pain, visual disturbances, or discharge  ENMT:  No difficulty hearing, tinnitus, vertigo; No sinus or throat pain  NECK: No pain or stiffness  RESPIRATORY: No cough, wheezing, chills or hemoptysis; No shortness of breath  CARDIOVASCULAR: No chest pain, palpitations, dizziness, or leg swelling  GASTROINTESTINAL: has constipation  GENITOURINARY: No dysuria, frequency, hematuria, or incontinence  NEUROLOGICAL: No headaches, memory loss, loss of strength, numbness, or tremors  SKIN: No itching, burning, rashes, or lesions   LYMPH NODES: No enlarged glands  ENDOCRINE: No heat or cold intolerance; No hair loss  MUSCULOSKELETAL: right calf pain, could not ambulate  PSYCHIATRIC: No depression, anxiety, mood swings, or difficulty sleeping  HEME/LYMPH: No easy bruising, or bleeding gums  ALLERY AND IMMUNOLOGIC: No hives or eczema

## 2017-08-27 RX ADMIN — Medication 1 MILLIGRAM(S): at 11:57

## 2017-08-27 RX ADMIN — Medication 500 MILLIGRAM(S): at 11:57

## 2017-08-27 RX ADMIN — HYDROMORPHONE HYDROCHLORIDE 1 MILLIGRAM(S): 2 INJECTION INTRAMUSCULAR; INTRAVENOUS; SUBCUTANEOUS at 00:41

## 2017-08-27 RX ADMIN — ENOXAPARIN SODIUM 40 MILLIGRAM(S): 100 INJECTION SUBCUTANEOUS at 17:39

## 2017-08-27 RX ADMIN — HYDROMORPHONE HYDROCHLORIDE 1 MILLIGRAM(S): 2 INJECTION INTRAMUSCULAR; INTRAVENOUS; SUBCUTANEOUS at 16:32

## 2017-08-27 RX ADMIN — Medication 100 MILLIGRAM(S): at 11:57

## 2017-08-27 RX ADMIN — SENNA PLUS 2 TABLET(S): 8.6 TABLET ORAL at 22:38

## 2017-08-27 RX ADMIN — Medication 325 MILLIGRAM(S): at 11:57

## 2017-08-27 RX ADMIN — TRAMADOL HYDROCHLORIDE 50 MILLIGRAM(S): 50 TABLET ORAL at 06:50

## 2017-08-27 RX ADMIN — Medication 25 MILLIGRAM(S): at 06:11

## 2017-08-27 RX ADMIN — HYDROMORPHONE HYDROCHLORIDE 1 MILLIGRAM(S): 2 INJECTION INTRAMUSCULAR; INTRAVENOUS; SUBCUTANEOUS at 01:05

## 2017-08-27 RX ADMIN — LOSARTAN POTASSIUM 100 MILLIGRAM(S): 100 TABLET, FILM COATED ORAL at 06:11

## 2017-08-27 RX ADMIN — HYDROMORPHONE HYDROCHLORIDE 1 MILLIGRAM(S): 2 INJECTION INTRAMUSCULAR; INTRAVENOUS; SUBCUTANEOUS at 17:40

## 2017-08-27 RX ADMIN — TRAMADOL HYDROCHLORIDE 50 MILLIGRAM(S): 50 TABLET ORAL at 06:08

## 2017-08-27 RX ADMIN — ENOXAPARIN SODIUM 40 MILLIGRAM(S): 100 INJECTION SUBCUTANEOUS at 06:11

## 2017-08-27 NOTE — PROGRESS NOTE ADULT - SUBJECTIVE AND OBJECTIVE BOX
Patient is a 52y old  Female who presents with a chief complaint of right calf pain (26 Aug 2017 04:14)    pt seen in icu [  ], reg med floor [ x  ], bed [x  ], chair at bedside [   ], a+o x3 [ x ], lethargic [  ],  nad [x  ]        Allergies    Percocet 5/325 (Hives; Rash)  strawberry (Rash; Hives)        Vitals    T(F): 98.6 (17 @ 05:48), Max: 98.9 (17 @ 20:50)  HR: 61 (17 @ 05:48) (61 - 85)  BP: 121/72 (17 @ 05:48) (121/72 - 135/67)  RR: 14 (17 @ 05:48) (14 - 16)  SpO2: 100% (17 @ 05:48) (98% - 100%)  Wt(kg): --  CAPILLARY BLOOD GLUCOSE          Labs                          10.3   5.4   )-----------( 276      ( 26 Aug 2017 07:30 )             34.1           138  |  103  |  13  ----------------------------<  105<H>  3.9   |  28  |  0.89    Ca    9.7      26 Aug 2017 07:30  Phos  4.0       Mg     2.0         TPro  8.1  /  Alb  3.3<L>  /  TBili  0.6  /  DBili  x   /  AST  25  /  ALT  28  /  AlkPhos  80  08-25      CARDIAC MARKERS ( 25 Aug 2017 23:08 )  x     / x     / 82 U/L / x     / x                Radiology Results    < from: CT Lower Extremity No Cont, Right (17 @ 08:58) >  IMPRESSION:   1.  Status post total knee arthroplasty with moderate knee joint   effusion. Postsurgical changes of the distal quadriceps tendon.  2.  No periprosthetic fracture or lucency to suggest loosening.  3.  No intramuscular hematoma is clinically questioned.    < end of copied text >          < from: US Duplex Venous Lower Ext Complete, Bilateral (17 @ 12:46) >  IMPRESSION:  No evidence of femoropopliteal deep vein thrombosis in either lower   extremity.    < end of copied text >          Meds    MEDICATIONS  (STANDING):  losartan 100 milliGRAM(s) Oral daily  hydrochlorothiazide 25 milliGRAM(s) Oral daily  ferrous    sulfate 325 milliGRAM(s) Oral daily  senna 2 Tablet(s) Oral at bedtime  ascorbic acid 500 milliGRAM(s) Oral daily  folic acid 1 milliGRAM(s) Oral daily  docusate sodium 100 milliGRAM(s) Oral daily  enoxaparin Injectable 40 milliGRAM(s) SubCutaneous two times a day      MEDICATIONS  (PRN):  traMADol 50 milliGRAM(s) Oral every 6 hours PRN Moderate Pain (4 - 6)  HYDROmorphone  Injectable 1 milliGRAM(s) IV Push every 8 hours PRN Severe Pain (7 - 10)  acetaminophen   Tablet. 650 milliGRAM(s) Oral every 6 hours PRN Mild Pain (1 - 3)      Physical Exam    Neuro :  no focal deficits  Respiratory: CTA B/L  CV: RRR, S1S2, no murmurs,   Abdominal: Soft, NT, ND +BS,  Extremities: med left lower extrem edema and tenderness, left knee surg wound clean and dry staples intact          ASSESSMENT    Pain of right lower leg  d/t r/o  hematoma right lower extrem r/o  right knee effusion  h/o Hypertension  Primary osteoarthritis of right knee  Menorrhagia with regular cycle  Morbid obesity  S/P TKR (total knee replacement), right  S/P arthroscopy of right knee  S/P  section  S/P hysterectomy      PLAN    cont pain control  phys tx  ct right leg neg for hematoma noted above  doppler lower extrem neg for dvt noted above  pt s/p right tka with post op changes  ortho cons  keep right le elevated  cont current meds

## 2017-08-28 DIAGNOSIS — M17.11 UNILATERAL PRIMARY OSTEOARTHRITIS, RIGHT KNEE: ICD-10-CM

## 2017-08-28 DIAGNOSIS — M79.661 PAIN IN RIGHT LOWER LEG: ICD-10-CM

## 2017-08-28 RX ADMIN — HYDROMORPHONE HYDROCHLORIDE 1 MILLIGRAM(S): 2 INJECTION INTRAMUSCULAR; INTRAVENOUS; SUBCUTANEOUS at 20:21

## 2017-08-28 RX ADMIN — Medication 325 MILLIGRAM(S): at 12:13

## 2017-08-28 RX ADMIN — HYDROMORPHONE HYDROCHLORIDE 1 MILLIGRAM(S): 2 INJECTION INTRAMUSCULAR; INTRAVENOUS; SUBCUTANEOUS at 12:47

## 2017-08-28 RX ADMIN — Medication 1 MILLIGRAM(S): at 12:14

## 2017-08-28 RX ADMIN — SENNA PLUS 2 TABLET(S): 8.6 TABLET ORAL at 20:23

## 2017-08-28 RX ADMIN — LOSARTAN POTASSIUM 100 MILLIGRAM(S): 100 TABLET, FILM COATED ORAL at 05:56

## 2017-08-28 RX ADMIN — HYDROMORPHONE HYDROCHLORIDE 1 MILLIGRAM(S): 2 INJECTION INTRAMUSCULAR; INTRAVENOUS; SUBCUTANEOUS at 00:45

## 2017-08-28 RX ADMIN — Medication 25 MILLIGRAM(S): at 05:56

## 2017-08-28 RX ADMIN — TRAMADOL HYDROCHLORIDE 50 MILLIGRAM(S): 50 TABLET ORAL at 05:57

## 2017-08-28 RX ADMIN — ENOXAPARIN SODIUM 40 MILLIGRAM(S): 100 INJECTION SUBCUTANEOUS at 05:58

## 2017-08-28 RX ADMIN — Medication 500 MILLIGRAM(S): at 12:13

## 2017-08-28 RX ADMIN — HYDROMORPHONE HYDROCHLORIDE 1 MILLIGRAM(S): 2 INJECTION INTRAMUSCULAR; INTRAVENOUS; SUBCUTANEOUS at 12:17

## 2017-08-28 RX ADMIN — TRAMADOL HYDROCHLORIDE 50 MILLIGRAM(S): 50 TABLET ORAL at 08:00

## 2017-08-28 RX ADMIN — ENOXAPARIN SODIUM 40 MILLIGRAM(S): 100 INJECTION SUBCUTANEOUS at 17:20

## 2017-08-28 RX ADMIN — Medication 100 MILLIGRAM(S): at 12:13

## 2017-08-28 RX ADMIN — HYDROMORPHONE HYDROCHLORIDE 1 MILLIGRAM(S): 2 INJECTION INTRAMUSCULAR; INTRAVENOUS; SUBCUTANEOUS at 21:21

## 2017-08-28 RX ADMIN — HYDROMORPHONE HYDROCHLORIDE 1 MILLIGRAM(S): 2 INJECTION INTRAMUSCULAR; INTRAVENOUS; SUBCUTANEOUS at 00:27

## 2017-08-28 NOTE — PROGRESS NOTE ADULT - SUBJECTIVE AND OBJECTIVE BOX
52yFemale      Patient was seen and evaluated at bedside. Pt is s/p R TKA on 8/7/17, [facility] and on thursday felt "intense" pain to the Right calf region- she then was sent to the ER and a doppler was negative for a DVT. Pt states since then the pain has subsided and she now notes significant improvement Denies any current pain.   Pain is  well controlled.  Pt states she was seen by  last week and the staples were dc'd.   Denies CP/SOB, dyspnea, paresthesias, N/V/D, palpitations.     Vital Signs Last 24 Hrs  T(C): 37.2 (28 Aug 2017 05:35), Max: 37.2 (27 Aug 2017 21:58)  T(F): 99 (28 Aug 2017 05:35), Max: 99 (28 Aug 2017 05:35)  HR: 59 (28 Aug 2017 11:00) (56 - 67)  BP: 113/86 (28 Aug 2017 11:00) (113/86 - 138/82)  BP(mean): --  RR: 16 (28 Aug 2017 05:35) (16 - 18)  SpO2: 99% (28 Aug 2017 05:35) (98% - 99%)  I&O's Detail      Physical Exam:    General: AAOx3, NAD, resting comfortably in bed.    R KNEE:  Steri-strips intact Incision site healing well, No drainage, No TTP, no swelling, no erythema/ecchymosis. ROM: 0-70.   Lower extremity:  No calf tenderness, calves are soft. Negative Karine sign. 2+pulses. NVI. 5/5 Strength of EHL/TA/gastrocnemius B/L.  Good capillary refill.     Impression:  52yFemale S/p R TKA on 8/7/17   Plan:  -  Pain management  -  Dvt prophylaxis  -  Calf pain resolved, Doppler negative for DVT- could be muscle strain/spasm- resolved.  -  Daily Physical Therapy:  WBAT  to RLE  -  Discharge planning: Home Vs. Rehab   -  Heel bump to RLE  -  Incentive Spirometer  -  Elevation/ice to RLE  -  Case d/w Dr. Holbrook  - Follow-Up with Dr. Holbrook at [phone]

## 2017-08-28 NOTE — PHYSICAL THERAPY INITIAL EVALUATION ADULT - GENERAL OBSERVATIONS, REHAB EVAL
pt seated at bedside, pmh ~ [facility], steri strips intact, improving post op pain, I adl rw ambulator pt seated at bedside, pmh ~ 1 week R TKR [facility], steri strips intact, improving post op pain, rw adl ambulator with standby assist

## 2017-08-28 NOTE — PHYSICAL THERAPY INITIAL EVALUATION ADULT - IMPAIRMENTS FOUND, PT EVAL
ergonomics and body mechanics/gait, locomotion, and balance/gross motor/joint integrity and mobility/muscle strength/ROM

## 2017-08-28 NOTE — PHYSICAL THERAPY INITIAL EVALUATION ADULT - MANUAL MUSCLE TESTING RESULTS, REHAB EVAL
RLE hip 4-/5; knee 3-/5 and ankle 4-/5 otherwise NORMAL/grossly assessed due to RLE hip 4-/5; knee 3-/5 and ankle 4-/5 otherwise 5-/5/grossly assessed due to

## 2017-08-28 NOTE — PROGRESS NOTE ADULT - SUBJECTIVE AND OBJECTIVE BOX
Patient is a 52y old  Female who presents with a chief complaint of right calf pain (26 Aug 2017 04:14)    pt seen in icu [  ], reg med floor [ x  ], bed [x  ], chair at bedside [   ], a+o x3 [ x ], lethargic [  ],  nad [x  ]      Allergies    Percocet 5/325 (Hives; Rash)  strawberry (Rash; Hives)        Vitals    T(F): 99 (17 @ 05:35), Max: 99 (17 @ 05:35)  HR: 56 (17 @ 05:35) (56 - 67)  BP: 138/82 (17 @ 05:35) (121/58 - 138/82)  RR: 16 (17 @ 05:35) (16 - 18)  SpO2: 99% (17 @ 05:35) (98% - 99%)  Wt(kg): --  CAPILLARY BLOOD GLUCOSE          Labs      Radiology Results      Meds    MEDICATIONS  (STANDING):  losartan 100 milliGRAM(s) Oral daily  hydrochlorothiazide 25 milliGRAM(s) Oral daily  ferrous    sulfate 325 milliGRAM(s) Oral daily  senna 2 Tablet(s) Oral at bedtime  ascorbic acid 500 milliGRAM(s) Oral daily  folic acid 1 milliGRAM(s) Oral daily  docusate sodium 100 milliGRAM(s) Oral daily  enoxaparin Injectable 40 milliGRAM(s) SubCutaneous two times a day      MEDICATIONS  (PRN):  traMADol 50 milliGRAM(s) Oral every 6 hours PRN Moderate Pain (4 - 6)  HYDROmorphone  Injectable 1 milliGRAM(s) IV Push every 8 hours PRN Severe Pain (7 - 10)  acetaminophen   Tablet. 650 milliGRAM(s) Oral every 6 hours PRN Mild Pain (1 - 3)      Physical Exam      Neuro :  no focal deficits  Respiratory: CTA B/L  CV: RRR, S1S2, no murmurs,   Abdominal: Soft, NT, ND +BS,  Extremities: med left lower extrem edema and tenderness, left knee surg wound clean and dry sterri strips intact          ASSESSMENT    Pain of right lower leg  d/t r/o  hematoma right lower extrem r/o  right knee effusion  h/o Hypertension  Primary osteoarthritis of right knee  Menorrhagia with regular cycle  Morbid obesity  S/P TKR (total knee replacement), right  S/P arthroscopy of right knee  S/P  section  S/P hysterectomy      PLAN    cont pain control  phys tx  ct right leg neg for hematoma noted  doppler lower extrem neg for dvt noted  pt s/p right tka with post op changes  ortho cons  keep right le elevated  phys tx  cont current meds  d/c planning

## 2017-08-28 NOTE — PHYSICAL THERAPY INITIAL EVALUATION ADULT - IMPAIRMENTS CONTRIBUTING TO GAIT DEVIATIONS, PT EVAL
decreased flexibility/impaired motor control/narrow base of support/decreased ROM/decreased strength impaired balance/decreased flexibility/impaired motor control/narrow base of support/decreased ROM/decreased strength

## 2017-08-29 DIAGNOSIS — Z02.9 ENCOUNTER FOR ADMINISTRATIVE EXAMINATIONS, UNSPECIFIED: ICD-10-CM

## 2017-08-29 LAB
ANION GAP SERPL CALC-SCNC: 10 MMOL/L — SIGNIFICANT CHANGE UP (ref 5–17)
BUN SERPL-MCNC: 15 MG/DL — SIGNIFICANT CHANGE UP (ref 7–18)
CALCIUM SERPL-MCNC: 10 MG/DL — SIGNIFICANT CHANGE UP (ref 8.4–10.5)
CHLORIDE SERPL-SCNC: 102 MMOL/L — SIGNIFICANT CHANGE UP (ref 96–108)
CO2 SERPL-SCNC: 28 MMOL/L — SIGNIFICANT CHANGE UP (ref 22–31)
CREAT SERPL-MCNC: 0.8 MG/DL — SIGNIFICANT CHANGE UP (ref 0.5–1.3)
GLUCOSE SERPL-MCNC: 111 MG/DL — HIGH (ref 70–99)
HCT VFR BLD CALC: 31.7 % — LOW (ref 34.5–45)
HGB BLD-MCNC: 10 G/DL — LOW (ref 11.5–15.5)
MCHC RBC-ENTMCNC: 27 PG — SIGNIFICANT CHANGE UP (ref 27–34)
MCHC RBC-ENTMCNC: 31.5 GM/DL — LOW (ref 32–36)
MCV RBC AUTO: 85.5 FL — SIGNIFICANT CHANGE UP (ref 80–100)
PLATELET # BLD AUTO: 215 K/UL — SIGNIFICANT CHANGE UP (ref 150–400)
POTASSIUM SERPL-MCNC: 3.6 MMOL/L — SIGNIFICANT CHANGE UP (ref 3.5–5.3)
POTASSIUM SERPL-SCNC: 3.6 MMOL/L — SIGNIFICANT CHANGE UP (ref 3.5–5.3)
RBC # BLD: 3.71 M/UL — LOW (ref 3.8–5.2)
RBC # FLD: 15.8 % — HIGH (ref 10.3–14.5)
SODIUM SERPL-SCNC: 140 MMOL/L — SIGNIFICANT CHANGE UP (ref 135–145)
WBC # BLD: 4 K/UL — SIGNIFICANT CHANGE UP (ref 3.8–10.5)
WBC # FLD AUTO: 4 K/UL — SIGNIFICANT CHANGE UP (ref 3.8–10.5)

## 2017-08-29 RX ADMIN — SENNA PLUS 2 TABLET(S): 8.6 TABLET ORAL at 20:53

## 2017-08-29 RX ADMIN — ENOXAPARIN SODIUM 40 MILLIGRAM(S): 100 INJECTION SUBCUTANEOUS at 18:14

## 2017-08-29 RX ADMIN — LOSARTAN POTASSIUM 100 MILLIGRAM(S): 100 TABLET, FILM COATED ORAL at 05:15

## 2017-08-29 RX ADMIN — HYDROMORPHONE HYDROCHLORIDE 1 MILLIGRAM(S): 2 INJECTION INTRAMUSCULAR; INTRAVENOUS; SUBCUTANEOUS at 12:50

## 2017-08-29 RX ADMIN — Medication 100 MILLIGRAM(S): at 12:53

## 2017-08-29 RX ADMIN — ENOXAPARIN SODIUM 40 MILLIGRAM(S): 100 INJECTION SUBCUTANEOUS at 05:15

## 2017-08-29 RX ADMIN — Medication 325 MILLIGRAM(S): at 12:53

## 2017-08-29 RX ADMIN — HYDROMORPHONE HYDROCHLORIDE 1 MILLIGRAM(S): 2 INJECTION INTRAMUSCULAR; INTRAVENOUS; SUBCUTANEOUS at 05:15

## 2017-08-29 RX ADMIN — HYDROMORPHONE HYDROCHLORIDE 1 MILLIGRAM(S): 2 INJECTION INTRAMUSCULAR; INTRAVENOUS; SUBCUTANEOUS at 14:42

## 2017-08-29 RX ADMIN — HYDROMORPHONE HYDROCHLORIDE 1 MILLIGRAM(S): 2 INJECTION INTRAMUSCULAR; INTRAVENOUS; SUBCUTANEOUS at 21:52

## 2017-08-29 RX ADMIN — Medication 25 MILLIGRAM(S): at 05:15

## 2017-08-29 RX ADMIN — Medication 500 MILLIGRAM(S): at 12:53

## 2017-08-29 RX ADMIN — HYDROMORPHONE HYDROCHLORIDE 1 MILLIGRAM(S): 2 INJECTION INTRAMUSCULAR; INTRAVENOUS; SUBCUTANEOUS at 20:52

## 2017-08-29 RX ADMIN — HYDROMORPHONE HYDROCHLORIDE 1 MILLIGRAM(S): 2 INJECTION INTRAMUSCULAR; INTRAVENOUS; SUBCUTANEOUS at 05:30

## 2017-08-29 RX ADMIN — Medication 1 MILLIGRAM(S): at 12:53

## 2017-08-29 NOTE — PROGRESS NOTE ADULT - SUBJECTIVE AND OBJECTIVE BOX
NP Note discussed with  primary attending    Patient is a 52y old Female who presented w/ right calf pain and ruled out DVT.  Reports that pain "is ceasing", rated 5/10.  States that she can now bear weight on right leg and has been ambulating.  Reports that she's able to go longer periods w/o pain med.  However, reports throbbing right knee pain at night.     INTERVAL HPI/OVERNIGHT EVENTS: no new complaints    MEDICATIONS  (STANDING):  losartan 100 milliGRAM(s) Oral daily  hydrochlorothiazide 25 milliGRAM(s) Oral daily  ferrous    sulfate 325 milliGRAM(s) Oral daily  senna 2 Tablet(s) Oral at bedtime  ascorbic acid 500 milliGRAM(s) Oral daily  folic acid 1 milliGRAM(s) Oral daily  docusate sodium 100 milliGRAM(s) Oral daily  enoxaparin Injectable 40 milliGRAM(s) SubCutaneous two times a day    MEDICATIONS  (PRN):  traMADol 50 milliGRAM(s) Oral every 6 hours PRN Moderate Pain (4 - 6)  HYDROmorphone  Injectable 1 milliGRAM(s) IV Push every 8 hours PRN Severe Pain (7 - 10)  acetaminophen   Tablet. 650 milliGRAM(s) Oral every 6 hours PRN Mild Pain (1 - 3)      __________________________________________________  REVIEW OF SYSTEMS:    CONSTITUTIONAL: No fever  EYES: No acute visual disturbances  NECK: No pain or stiffness  RESPIRATORY: No cough; No shortness of breath  CARDIOVASCULAR: No chest pain, no palpitations  GASTROINTESTINAL: No pain. No nausea or vomiting.  No diarrhea   NEUROLOGICAL: No headache or numbness, no tremors  MUSCULOSKELETAL: (+) Right knee pain, no muscle pain  GENITOURINARY: No dysuria, no frequency, no hesitancy  PSYCHIATRY: No depression , no anxiety  ALL OTHER  ROS negative        Vital Signs Last 24 Hrs  T(C): 36.9 (29 Aug 2017 14:32), Max: 37.3 (28 Aug 2017 20:28)  T(F): 98.4 (29 Aug 2017 14:32), Max: 99.2 (28 Aug 2017 20:28)  HR: 69 (29 Aug 2017 14:32) (69 - 87)  BP: 129/57 (29 Aug 2017 14:32) (122/62 - 146/83)  BP(mean): --  RR: 16 (29 Aug 2017 14:32) (16 - 16)  SpO2: 100% (29 Aug 2017 14:32) (97% - 100%)    ________________________________________________  PHYSICAL EXAM:  GENERAL: NAD  HEENT: Normocephalic;  conjunctivae and sclerae clear; moist mucous membranes;   NECK : supple  CHEST/LUNG: Clear to auscultation bilaterally with good air entry   HEART: S1 S2  regular; no murmurs, gallops or rubs  ABDOMEN: Soft, Nontender, Nondistended; Bowel sounds present x 4 quad  EXTREMITIES: No cyanosis; Mild right knee edema, not warm to touch; no calf tenderness  SKIN: Warm and dry; no rash  NERVOUS SYSTEM:  Awake and alert; Oriented  to place, person and time ; no new deficits    _________________________________________________  LABS:                        10.0   4.0   )-----------( 215      ( 29 Aug 2017 07:09 )             31.7     08-29    140  |  102  |  15  ----------------------------<  111<H>  3.6   |  28  |  0.80    Ca    10.0      29 Aug 2017 07:09          RADIOLOGY & ADDITIONAL TESTS:    Consultant(s) Notes Reviewed:   YES    Care Discussed with Consultants :     Plan of care was discussed with patient and /or primary care giver; all questions and concerns were addressed and care was aligned with patient's wishes.

## 2017-08-29 NOTE — PROGRESS NOTE ADULT - SUBJECTIVE AND OBJECTIVE BOX
Patient is a 52y old  Female who presents with a chief complaint of right calf pain (26 Aug 2017 04:14)    pt seen in icu [  ], reg med floor [ x  ], bed [x  ], chair at bedside [   ], a+o x3 [ x ], lethargic [  ],  nad [x  ]      Allergies    Percocet 5/325 (Hives; Rash)  strawberry (Rash; Hives)        Vitals    T(F): 98.6 (17 @ 05:05), Max: 99.2 (17 @ 20:28)  HR: 70 (17 @ 05:05) (56 - 73)  BP: 146/83 (17 @ 05:05) (113/86 - 166/51)  RR: 16 (17 @ 05:05) (16 - 17)  SpO2: 97% (17 @ 05:05) (97% - 100%)  Wt(kg): --  CAPILLARY BLOOD GLUCOSE          Labs            140  |  102  |  x   ----------------------------<  111<H>  3.6   |  x   |  x                     Radiology Results      Meds    MEDICATIONS  (STANDING):  losartan 100 milliGRAM(s) Oral daily  hydrochlorothiazide 25 milliGRAM(s) Oral daily  ferrous    sulfate 325 milliGRAM(s) Oral daily  senna 2 Tablet(s) Oral at bedtime  ascorbic acid 500 milliGRAM(s) Oral daily  folic acid 1 milliGRAM(s) Oral daily  docusate sodium 100 milliGRAM(s) Oral daily  enoxaparin Injectable 40 milliGRAM(s) SubCutaneous two times a day      MEDICATIONS  (PRN):  traMADol 50 milliGRAM(s) Oral every 6 hours PRN Moderate Pain (4 - 6)  HYDROmorphone  Injectable 1 milliGRAM(s) IV Push every 8 hours PRN Severe Pain (7 - 10)  acetaminophen   Tablet. 650 milliGRAM(s) Oral every 6 hours PRN Mild Pain (1 - 3)      Physical Exam      Neuro :  no focal deficits  Respiratory: CTA B/L  CV: RRR, S1S2, no murmurs,   Abdominal: Soft, NT, ND +BS,  Extremities:  left knee surg wound clean and dry sterri strips intact, pulses +          ASSESSMENT    Pain of right lower leg  d/t r/o  hematoma right lower extrem r/o  right knee effusion  h/o Hypertension  Primary osteoarthritis of right knee  Menorrhagia with regular cycle  Morbid obesity  S/P TKR (total knee replacement), right  S/P arthroscopy of right knee  S/P  section  S/P hysterectomy      PLAN    cont pain control  ct right leg neg for hematoma noted  doppler lower extrem neg for dvt noted  pt s/p right tka with post op changes  ortho cons noted  keep right le elevated  phys tx  cont current meds  d/c planning to [facility]

## 2017-08-30 RX ADMIN — SENNA PLUS 2 TABLET(S): 8.6 TABLET ORAL at 22:48

## 2017-08-30 RX ADMIN — Medication 100 MILLIGRAM(S): at 12:20

## 2017-08-30 RX ADMIN — HYDROMORPHONE HYDROCHLORIDE 1 MILLIGRAM(S): 2 INJECTION INTRAMUSCULAR; INTRAVENOUS; SUBCUTANEOUS at 05:22

## 2017-08-30 RX ADMIN — LOSARTAN POTASSIUM 100 MILLIGRAM(S): 100 TABLET, FILM COATED ORAL at 05:08

## 2017-08-30 RX ADMIN — HYDROMORPHONE HYDROCHLORIDE 1 MILLIGRAM(S): 2 INJECTION INTRAMUSCULAR; INTRAVENOUS; SUBCUTANEOUS at 23:02

## 2017-08-30 RX ADMIN — HYDROMORPHONE HYDROCHLORIDE 1 MILLIGRAM(S): 2 INJECTION INTRAMUSCULAR; INTRAVENOUS; SUBCUTANEOUS at 14:29

## 2017-08-30 RX ADMIN — Medication 25 MILLIGRAM(S): at 05:07

## 2017-08-30 RX ADMIN — HYDROMORPHONE HYDROCHLORIDE 1 MILLIGRAM(S): 2 INJECTION INTRAMUSCULAR; INTRAVENOUS; SUBCUTANEOUS at 05:07

## 2017-08-30 RX ADMIN — HYDROMORPHONE HYDROCHLORIDE 1 MILLIGRAM(S): 2 INJECTION INTRAMUSCULAR; INTRAVENOUS; SUBCUTANEOUS at 15:53

## 2017-08-30 RX ADMIN — Medication 325 MILLIGRAM(S): at 12:20

## 2017-08-30 RX ADMIN — ENOXAPARIN SODIUM 40 MILLIGRAM(S): 100 INJECTION SUBCUTANEOUS at 18:40

## 2017-08-30 RX ADMIN — ENOXAPARIN SODIUM 40 MILLIGRAM(S): 100 INJECTION SUBCUTANEOUS at 05:07

## 2017-08-30 RX ADMIN — Medication 500 MILLIGRAM(S): at 12:20

## 2017-08-30 RX ADMIN — Medication 1 MILLIGRAM(S): at 12:20

## 2017-08-30 NOTE — PROGRESS NOTE ADULT - SUBJECTIVE AND OBJECTIVE BOX
Patient is a 52y old  Female who presents with a chief complaint of right calf pain (26 Aug 2017 04:14)    pt seen in icu [  ], reg med floor [ x  ], bed [x  ], chair at bedside [   ], a+o x3 [ x ], lethargic [  ],  nad [x  ]      Allergies    Percocet 5/325 (Hives; Rash)  strawberry (Rash; Hives)        Vitals    T(F): 97.9 (17 @ 05:05), Max: 99 (17 @ 20:57)  HR: 61 (17 @ 05:05) (61 - 87)  BP: 136/71 (17 @ 05:05) (129/57 - 136/71)  RR: 16 (17 @ 05:05) (16 - 16)  SpO2: 100% (17 @ 05:05) (100% - 100%)  Wt(kg): --  CAPILLARY BLOOD GLUCOSE          Labs                          10.0   4.0   )-----------( 215      ( 29 Aug 2017 07:09 )             31.7           140  |  102  |  15  ----------------------------<  111<H>  3.6   |  28  |  0.80    Ca    10.0      29 Aug 2017 07:09                  Radiology Results      Meds    MEDICATIONS  (STANDING):  losartan 100 milliGRAM(s) Oral daily  hydrochlorothiazide 25 milliGRAM(s) Oral daily  ferrous    sulfate 325 milliGRAM(s) Oral daily  senna 2 Tablet(s) Oral at bedtime  ascorbic acid 500 milliGRAM(s) Oral daily  folic acid 1 milliGRAM(s) Oral daily  docusate sodium 100 milliGRAM(s) Oral daily  enoxaparin Injectable 40 milliGRAM(s) SubCutaneous two times a day      MEDICATIONS  (PRN):  traMADol 50 milliGRAM(s) Oral every 6 hours PRN Moderate Pain (4 - 6)  HYDROmorphone  Injectable 1 milliGRAM(s) IV Push every 8 hours PRN Severe Pain (7 - 10)  acetaminophen   Tablet. 650 milliGRAM(s) Oral every 6 hours PRN Mild Pain (1 - 3)      Physical Exam    Neuro :  no focal deficits  Respiratory: CTA B/L  CV: RRR, S1S2, no murmurs,   Abdominal: Soft, NT, ND +BS,  Extremities:  left knee surg wound clean and dry sterri strips intact, pulses +          ASSESSMENT    Pain of right lower leg  d/t r/o  hematoma right lower extrem r/o  right knee effusion  h/o Hypertension  Primary osteoarthritis of right knee  Menorrhagia with regular cycle  Morbid obesity  S/P TKR (total knee replacement), right  S/P arthroscopy of right knee  S/P  section  S/P hysterectomy      PLAN    cont pain control  ct right leg neg for hematoma noted  doppler lower extrem neg for dvt noted  pt s/p right tka with post op changes  ortho cons noted  keep right le elevated  phys tx  cont current meds  d/c planning to [facility]

## 2017-08-31 ENCOUNTER — TRANSCRIPTION ENCOUNTER (OUTPATIENT)
Age: 52
End: 2017-08-31

## 2017-08-31 RX ORDER — ACETAMINOPHEN 500 MG
2 TABLET ORAL
Qty: 0 | Refills: 0 | DISCHARGE
Start: 2017-08-31

## 2017-08-31 RX ORDER — DOCUSATE SODIUM 100 MG
1 CAPSULE ORAL
Qty: 0 | Refills: 0 | DISCHARGE
Start: 2017-08-31

## 2017-08-31 RX ORDER — TRAMADOL HYDROCHLORIDE 50 MG/1
1 TABLET ORAL
Qty: 0 | Refills: 0 | DISCHARGE
Start: 2017-08-31

## 2017-08-31 RX ORDER — LOSARTAN POTASSIUM 100 MG/1
1 TABLET, FILM COATED ORAL
Qty: 0 | Refills: 0 | DISCHARGE
Start: 2017-08-31

## 2017-08-31 RX ADMIN — HYDROMORPHONE HYDROCHLORIDE 1 MILLIGRAM(S): 2 INJECTION INTRAMUSCULAR; INTRAVENOUS; SUBCUTANEOUS at 07:57

## 2017-08-31 RX ADMIN — ENOXAPARIN SODIUM 40 MILLIGRAM(S): 100 INJECTION SUBCUTANEOUS at 06:27

## 2017-08-31 RX ADMIN — Medication 100 MILLIGRAM(S): at 11:17

## 2017-08-31 RX ADMIN — HYDROMORPHONE HYDROCHLORIDE 1 MILLIGRAM(S): 2 INJECTION INTRAMUSCULAR; INTRAVENOUS; SUBCUTANEOUS at 15:33

## 2017-08-31 RX ADMIN — Medication 1 MILLIGRAM(S): at 11:17

## 2017-08-31 RX ADMIN — HYDROMORPHONE HYDROCHLORIDE 1 MILLIGRAM(S): 2 INJECTION INTRAMUSCULAR; INTRAVENOUS; SUBCUTANEOUS at 08:36

## 2017-08-31 RX ADMIN — LOSARTAN POTASSIUM 100 MILLIGRAM(S): 100 TABLET, FILM COATED ORAL at 06:26

## 2017-08-31 RX ADMIN — Medication 500 MILLIGRAM(S): at 11:17

## 2017-08-31 RX ADMIN — ENOXAPARIN SODIUM 40 MILLIGRAM(S): 100 INJECTION SUBCUTANEOUS at 17:48

## 2017-08-31 RX ADMIN — HYDROMORPHONE HYDROCHLORIDE 1 MILLIGRAM(S): 2 INJECTION INTRAMUSCULAR; INTRAVENOUS; SUBCUTANEOUS at 15:51

## 2017-08-31 RX ADMIN — SENNA PLUS 2 TABLET(S): 8.6 TABLET ORAL at 21:11

## 2017-08-31 RX ADMIN — Medication 325 MILLIGRAM(S): at 11:17

## 2017-08-31 RX ADMIN — Medication 25 MILLIGRAM(S): at 06:26

## 2017-08-31 RX ADMIN — HYDROMORPHONE HYDROCHLORIDE 1 MILLIGRAM(S): 2 INJECTION INTRAMUSCULAR; INTRAVENOUS; SUBCUTANEOUS at 23:48

## 2017-08-31 NOTE — DISCHARGE NOTE ADULT - ADDITIONAL INSTRUCTIONS
Followup with PMD in two to three days.  Continue with rehabilitative services at [facility].

## 2017-08-31 NOTE — DISCHARGE NOTE ADULT - PLAN OF CARE
resolved No evidence of clot/hematoma found on imaging.  Followup with PMD <140/90 continue with antihypertensive medication, DASH diet, and continue with rehab services pt s/p TKR right knee Continue with rehab services at [facility].

## 2017-08-31 NOTE — DISCHARGE NOTE ADULT - MEDICATION SUMMARY - MEDICATIONS TO TAKE
I will START or STAY ON the medications listed below when I get home from the hospital:    traMADol 50 mg oral tablet  -- 1 tab(s) by mouth every 6 hours, As needed, Moderate Pain (4 - 6)  -- Indication: For Pain of right lower leg    acetaminophen 325 mg oral tablet  -- 2 tab(s) by mouth every 6 hours, As needed, Mild Pain (1 - 3)  -- Indication: For Pain of right lower leg    losartan 100 mg oral tablet  -- 1 tab(s) by mouth once a day  -- Indication: For Hypertension    enoxaparin  -- 40 milligram(s) subcutaneous every 12 hours  -- Indication: For Anticoagulant    hydroCHLOROthiazide 25 mg oral tablet  -- 1 tab(s) by mouth once a day  -- Indication: For Hypertension    ferrous sulfate 325 mg (65 mg elemental iron) oral tablet  -- 1 tab(s) by mouth once a day  -- Indication: For Anemia    senna 8.6 mg oral tablet  -- 2 tab(s) by mouth once a day (at bedtime)  -- Indication: For constipation    docusate sodium 100 mg oral capsule  -- 1 cap(s) by mouth once a day  -- Indication: For constipation    ascorbic acid 500 mg oral tablet  -- 1 tab(s) by mouth 2 times a day  -- Indication: For Supplement    folic acid 1 mg oral tablet  -- 1 tab(s) by mouth once a day  -- Indication: For Supplement

## 2017-08-31 NOTE — PROGRESS NOTE ADULT - SUBJECTIVE AND OBJECTIVE BOX
Patient is a 52y old  Female who presents with a chief complaint of right calf pain (26 Aug 2017 04:14)    pt seen in icu [  ], reg med floor [ x  ], bed [x  ], chair at bedside [   ], a+o x3 [ x ], lethargic [  ],  nad [x  ]      Allergies    Percocet 5/325 (Hives; Rash)  strawberry (Rash; Hives)        Vitals    T(F): 97.8 (17 @ 05:10), Max: 100.1 (17 @ 20:54)  HR: 56 (17 @ 05:10) (56 - 73)  BP: 125/79 (17 @ 05:10) (120/59 - 128/60)  RR: 16 (17 @ 05:10) (16 - 16)  SpO2: 100% (17 @ 05:10) (99% - 100%)  Wt(kg): --  CAPILLARY BLOOD GLUCOSE          Labs            Radiology Results      Meds    MEDICATIONS  (STANDING):  losartan 100 milliGRAM(s) Oral daily  hydrochlorothiazide 25 milliGRAM(s) Oral daily  ferrous    sulfate 325 milliGRAM(s) Oral daily  senna 2 Tablet(s) Oral at bedtime  ascorbic acid 500 milliGRAM(s) Oral daily  folic acid 1 milliGRAM(s) Oral daily  docusate sodium 100 milliGRAM(s) Oral daily  enoxaparin Injectable 40 milliGRAM(s) SubCutaneous two times a day      MEDICATIONS  (PRN):  traMADol 50 milliGRAM(s) Oral every 6 hours PRN Moderate Pain (4 - 6)  HYDROmorphone  Injectable 1 milliGRAM(s) IV Push every 8 hours PRN Severe Pain (7 - 10)  acetaminophen   Tablet. 650 milliGRAM(s) Oral every 6 hours PRN Mild Pain (1 - 3)      Physical Exam      Neuro :  no focal deficits  Respiratory: CTA B/L  CV: RRR, S1S2, no murmurs,   Abdominal: Soft, NT, ND +BS,  Extremities:  left knee surg wound clean and dry sterri strips intact, pulses +          ASSESSMENT    Pain of right lower leg  d/t r/o  hematoma right lower extrem r/o  right knee effusion  h/o Hypertension  Primary osteoarthritis of right knee  Menorrhagia with regular cycle  Morbid obesity  S/P TKR (total knee replacement), right  S/P arthroscopy of right knee  S/P  section  S/P hysterectomy      PLAN    cont pain control  ct right leg neg for hematoma noted  doppler lower extrem neg for dvt noted  pt s/p right tka with post op changes  ortho cons noted  keep right le elevated  phys tx  cont current meds  d/c planning to [facility] pending auth

## 2017-08-31 NOTE — DISCHARGE NOTE ADULT - HOSPITAL COURSE
51yo female status post total right knee replacement, mostly wheelchair bound at the moment w/ PMH of HTN, & anemia.  Presented to ED for right calf pain.    Admitted to medicine to Rule Out Deep Vein Thrombosis    Acute Deep Vein Thrombosis (DVT) of Right Lower Extremity  B/l lower extremity US ruled out DVT  CT right leg revealed status post total knee arthroplasty with moderate knee joint effusion. Postsurgical changes of the distal quadriceps tendon.  No periprosthetic fracture or lucency to suggest loosening.  No intramuscular hematoma.  Managed pain  Physical Therapist consulted and recommended rehabilitation    Hypertension  Monitored blood pressure and stable  Continued home medication    Anemia  Continued iron supplementation    Prophylactic Measure  Received Lovenox for DVT prophylaxis    Attending cleared patient for discharge to rehab.

## 2017-08-31 NOTE — DISCHARGE NOTE ADULT - CARE PLAN
Principal Discharge DX:	Right calf pain  Goal:	resolved  Instructions for follow-up, activity and diet:	No evidence of clot/hematoma found on imaging.  Followup with PMD  Secondary Diagnosis:	Hypertension  Goal:	<140/90  Instructions for follow-up, activity and diet:	continue with antihypertensive medication, DASH diet, and continue with rehab services  Secondary Diagnosis:	Primary osteoarthritis of right knee  Goal:	pt s/p TKR right knee  Instructions for follow-up, activity and diet:	Continue with rehab services at [facility]. Principal Discharge DX:	Right calf pain  Goal:	resolved  Instructions for follow-up, activity and diet:	No evidence of clot/hematoma found on imaging.  Followup with PMD  Secondary Diagnosis:	Hypertension  Goal:	<140/90  Instructions for follow-up, activity and diet:	continue with antihypertensive medication, DASH diet, and continue with rehab services  Secondary Diagnosis:	Primary osteoarthritis of right knee  Goal:	pt s/p TKR right knee  Instructions for follow-up, activity and diet:	Continue with rehab services at [facility]. Principal Discharge DX:	Right calf pain  Goal:	resolved  Instructions for follow-up, activity and diet:	No evidence of clot/hematoma found on imaging.  Followup with PMD  Secondary Diagnosis:	Hypertension  Goal:	<140/90  Instructions for follow-up, activity and diet:	continue with antihypertensive medication, DASH diet, and continue with rehab services  Secondary Diagnosis:	Primary osteoarthritis of right knee  Goal:	pt s/p TKR right knee  Instructions for follow-up, activity and diet:	Continue with rehab services at [facility].

## 2017-08-31 NOTE — DISCHARGE NOTE ADULT - MEDICATION SUMMARY - MEDICATIONS TO STOP TAKING
I will STOP taking the medications listed below when I get home from the hospital:    losartan-hydrochlorothiazide 100mg-25mg oral tablet  -- 1 tab(s) by mouth once a day

## 2017-08-31 NOTE — DISCHARGE NOTE ADULT - PATIENT PORTAL LINK FT
“You can access the [facility] Patient Portal, offered by [facility], by registering with the following website: [email or web address]”

## 2017-09-01 VITALS
TEMPERATURE: 98 F | RESPIRATION RATE: 16 BRPM | HEART RATE: 62 BPM | SYSTOLIC BLOOD PRESSURE: 116 MMHG | OXYGEN SATURATION: 100 % | DIASTOLIC BLOOD PRESSURE: 64 MMHG

## 2017-09-01 PROCEDURE — 83036 HEMOGLOBIN GLYCOSYLATED A1C: CPT

## 2017-09-01 PROCEDURE — 80061 LIPID PANEL: CPT

## 2017-09-01 PROCEDURE — 82550 ASSAY OF CK (CPK): CPT

## 2017-09-01 PROCEDURE — 82746 ASSAY OF FOLIC ACID SERUM: CPT

## 2017-09-01 PROCEDURE — 85027 COMPLETE CBC AUTOMATED: CPT

## 2017-09-01 PROCEDURE — 83735 ASSAY OF MAGNESIUM: CPT

## 2017-09-01 PROCEDURE — 80048 BASIC METABOLIC PNL TOTAL CA: CPT

## 2017-09-01 PROCEDURE — 85379 FIBRIN DEGRADATION QUANT: CPT

## 2017-09-01 PROCEDURE — 93970 EXTREMITY STUDY: CPT

## 2017-09-01 PROCEDURE — 73700 CT LOWER EXTREMITY W/O DYE: CPT

## 2017-09-01 PROCEDURE — 99285 EMERGENCY DEPT VISIT HI MDM: CPT | Mod: 25

## 2017-09-01 PROCEDURE — 97116 GAIT TRAINING THERAPY: CPT

## 2017-09-01 PROCEDURE — 84443 ASSAY THYROID STIM HORMONE: CPT

## 2017-09-01 PROCEDURE — 80053 COMPREHEN METABOLIC PANEL: CPT

## 2017-09-01 PROCEDURE — 93005 ELECTROCARDIOGRAM TRACING: CPT

## 2017-09-01 PROCEDURE — 86803 HEPATITIS C AB TEST: CPT

## 2017-09-01 PROCEDURE — 97161 PT EVAL LOW COMPLEX 20 MIN: CPT

## 2017-09-01 PROCEDURE — 82306 VITAMIN D 25 HYDROXY: CPT

## 2017-09-01 PROCEDURE — 84100 ASSAY OF PHOSPHORUS: CPT

## 2017-09-01 PROCEDURE — 82607 VITAMIN B-12: CPT

## 2017-09-01 RX ADMIN — Medication 325 MILLIGRAM(S): at 13:00

## 2017-09-01 RX ADMIN — HYDROMORPHONE HYDROCHLORIDE 1 MILLIGRAM(S): 2 INJECTION INTRAMUSCULAR; INTRAVENOUS; SUBCUTANEOUS at 08:43

## 2017-09-01 RX ADMIN — Medication 25 MILLIGRAM(S): at 07:05

## 2017-09-01 RX ADMIN — Medication 500 MILLIGRAM(S): at 13:00

## 2017-09-01 RX ADMIN — ENOXAPARIN SODIUM 40 MILLIGRAM(S): 100 INJECTION SUBCUTANEOUS at 07:07

## 2017-09-01 RX ADMIN — Medication 100 MILLIGRAM(S): at 13:00

## 2017-09-01 RX ADMIN — LOSARTAN POTASSIUM 100 MILLIGRAM(S): 100 TABLET, FILM COATED ORAL at 07:05

## 2017-09-01 RX ADMIN — HYDROMORPHONE HYDROCHLORIDE 1 MILLIGRAM(S): 2 INJECTION INTRAMUSCULAR; INTRAVENOUS; SUBCUTANEOUS at 09:33

## 2017-09-01 NOTE — PROGRESS NOTE ADULT - SUBJECTIVE AND OBJECTIVE BOX
Patient is a 52y old  Female who presents with a chief complaint of right calf pain (31 Aug 2017 14:29)    pt seen in icu [  ], reg med floor [ x  ], bed [x  ], chair at bedside [   ], a+o x3 [ x ], lethargic [  ],  nad [x  ]        Allergies    Percocet 5/325 (Hives; Rash)  strawberry (Rash; Hives)        Vitals    T(F): 97.7 (17 @ 05:00), Max: 99.1 (17 @ 13:43)  HR: 62 (17 @ 05:00) (58 - 67)  BP: 116/64 (17 @ 05:00) (107/75 - 122/63)  RR: 16 (17 @ 05:00) (16 - 16)  SpO2: 100% (17 @ 05:00) (100% - 100%)  Wt(kg): --  CAPILLARY BLOOD GLUCOSE          Labs                          Radiology Results      Meds    MEDICATIONS  (STANDING):  losartan 100 milliGRAM(s) Oral daily  hydrochlorothiazide 25 milliGRAM(s) Oral daily  ferrous    sulfate 325 milliGRAM(s) Oral daily  senna 2 Tablet(s) Oral at bedtime  ascorbic acid 500 milliGRAM(s) Oral daily  folic acid 1 milliGRAM(s) Oral daily  docusate sodium 100 milliGRAM(s) Oral daily  enoxaparin Injectable 40 milliGRAM(s) SubCutaneous two times a day      MEDICATIONS  (PRN):  traMADol 50 milliGRAM(s) Oral every 6 hours PRN Moderate Pain (4 - 6)  HYDROmorphone  Injectable 1 milliGRAM(s) IV Push every 8 hours PRN Severe Pain (7 - 10)  acetaminophen   Tablet. 650 milliGRAM(s) Oral every 6 hours PRN Mild Pain (1 - 3)      Physical Exam    Neuro :  no focal deficits  Respiratory: CTA B/L  CV: RRR, S1S2, no murmurs,   Abdominal: Soft, NT, ND +BS,  Extremities:  left knee surg wound clean and dry sterri strips intact, pulses +          ASSESSMENT    Pain of right lower leg  d/t r/o  hematoma right lower extrem r/o  right knee effusion  h/o Hypertension  Primary osteoarthritis of right knee  Menorrhagia with regular cycle  Morbid obesity  S/P TKR (total knee replacement), right  S/P arthroscopy of right knee  S/P  section  S/P hysterectomy      PLAN    cont pain control  ct right leg neg for hematoma noted  doppler lower extrem neg for dvt noted  pt s/p right tka with post op changes  ortho cons noted  keep right le elevated  phys tx  cont current meds  d/c planning to [facility] pending auth

## 2017-09-06 DIAGNOSIS — Z91.018 ALLERGY TO OTHER FOODS: ICD-10-CM

## 2017-09-06 DIAGNOSIS — M79.661 PAIN IN RIGHT LOWER LEG: ICD-10-CM

## 2017-09-06 DIAGNOSIS — M17.11 UNILATERAL PRIMARY OSTEOARTHRITIS, RIGHT KNEE: ICD-10-CM

## 2017-09-06 DIAGNOSIS — Z96.651 PRESENCE OF RIGHT ARTIFICIAL KNEE JOINT: ICD-10-CM

## 2017-09-06 DIAGNOSIS — Z90.710 ACQUIRED ABSENCE OF BOTH CERVIX AND UTERUS: ICD-10-CM

## 2017-09-06 DIAGNOSIS — E66.01 MORBID (SEVERE) OBESITY DUE TO EXCESS CALORIES: ICD-10-CM

## 2017-09-06 DIAGNOSIS — I10 ESSENTIAL (PRIMARY) HYPERTENSION: ICD-10-CM

## 2017-09-06 DIAGNOSIS — Z88.6 ALLERGY STATUS TO ANALGESIC AGENT: ICD-10-CM

## 2018-06-19 ENCOUNTER — OUTPATIENT (OUTPATIENT)
Dept: OUTPATIENT SERVICES | Facility: HOSPITAL | Age: 53
LOS: 1 days | End: 2018-06-19
Payer: MEDICARE

## 2018-06-19 VITALS
HEIGHT: 61.5 IN | HEART RATE: 77 BPM | WEIGHT: 199.96 LBS | RESPIRATION RATE: 16 BRPM | OXYGEN SATURATION: 98 % | TEMPERATURE: 98 F | SYSTOLIC BLOOD PRESSURE: 123 MMHG | DIASTOLIC BLOOD PRESSURE: 72 MMHG

## 2018-06-19 DIAGNOSIS — Z96.651 PRESENCE OF RIGHT ARTIFICIAL KNEE JOINT: Chronic | ICD-10-CM

## 2018-06-19 DIAGNOSIS — Z98.890 OTHER SPECIFIED POSTPROCEDURAL STATES: Chronic | ICD-10-CM

## 2018-06-19 DIAGNOSIS — Z98.891 HISTORY OF UTERINE SCAR FROM PREVIOUS SURGERY: Chronic | ICD-10-CM

## 2018-06-19 DIAGNOSIS — Z96.651 PRESENCE OF RIGHT ARTIFICIAL KNEE JOINT: ICD-10-CM

## 2018-06-19 DIAGNOSIS — Z01.818 ENCOUNTER FOR OTHER PREPROCEDURAL EXAMINATION: ICD-10-CM

## 2018-06-19 DIAGNOSIS — M25.661 STIFFNESS OF RIGHT KNEE, NOT ELSEWHERE CLASSIFIED: ICD-10-CM

## 2018-06-19 DIAGNOSIS — Z90.710 ACQUIRED ABSENCE OF BOTH CERVIX AND UTERUS: Chronic | ICD-10-CM

## 2018-06-19 LAB
MRSA PCR RESULT.: SIGNIFICANT CHANGE UP
S AUREUS DNA NOSE QL NAA+PROBE: SIGNIFICANT CHANGE UP

## 2018-06-19 PROCEDURE — G0463: CPT

## 2018-06-19 PROCEDURE — 86850 RBC ANTIBODY SCREEN: CPT

## 2018-06-19 PROCEDURE — 87640 STAPH A DNA AMP PROBE: CPT

## 2018-06-19 PROCEDURE — 86901 BLOOD TYPING SEROLOGIC RH(D): CPT

## 2018-06-19 PROCEDURE — 86900 BLOOD TYPING SEROLOGIC ABO: CPT

## 2018-06-19 RX ORDER — SODIUM CHLORIDE 9 MG/ML
3 INJECTION INTRAMUSCULAR; INTRAVENOUS; SUBCUTANEOUS EVERY 8 HOURS
Refills: 0 | Status: DISCONTINUED | OUTPATIENT
Start: 2018-07-02 | End: 2018-07-05

## 2018-06-19 RX ORDER — GABAPENTIN 400 MG/1
100 CAPSULE ORAL ONCE
Refills: 0 | Status: COMPLETED | OUTPATIENT
Start: 2018-07-02 | End: 2018-07-02

## 2018-06-19 RX ORDER — CELECOXIB 200 MG/1
200 CAPSULE ORAL ONCE
Refills: 0 | Status: COMPLETED | OUTPATIENT
Start: 2018-07-02 | End: 2018-07-02

## 2018-06-19 RX ORDER — TRAMADOL HYDROCHLORIDE 50 MG/1
50 TABLET ORAL ONCE
Refills: 0 | Status: DISCONTINUED | OUTPATIENT
Start: 2018-07-02 | End: 2018-07-02

## 2018-06-19 NOTE — H&P PST ADULT - NEGATIVE ENMT SYMPTOMS
no hearing difficulty/no ear pain/no tinnitus/no vertigo/no sinus symptoms/no nasal congestion/no dry mouth

## 2018-06-19 NOTE — H&P PST ADULT - MUSCULOSKELETAL
details… detailed exam Right knee/no joint erythema/no joint warmth/no calf tenderness/normal strength/decreased ROM due to pain/joint swelling

## 2018-06-19 NOTE — H&P PST ADULT - PMH
Hypertension    Menorrhagia with regular cycle    Morbid obesity  BMI- 40  Presence of right artificial knee joint    Primary osteoarthritis of right knee    Stiffness of right knee, not elsewhere classified

## 2018-06-29 RX ORDER — TRANEXAMIC ACID 100 MG/ML
1000 INJECTION, SOLUTION INTRAVENOUS ONCE
Refills: 0 | Status: DISCONTINUED | OUTPATIENT
Start: 2018-07-02 | End: 2018-07-02

## 2018-07-01 ENCOUNTER — TRANSCRIPTION ENCOUNTER (OUTPATIENT)
Age: 53
End: 2018-07-01

## 2018-07-02 ENCOUNTER — INPATIENT (INPATIENT)
Facility: HOSPITAL | Age: 53
LOS: 2 days | Discharge: HOME | DRG: 468 | End: 2018-07-05
Attending: ORTHOPAEDIC SURGERY | Admitting: ORTHOPAEDIC SURGERY
Payer: MEDICARE

## 2018-07-02 ENCOUNTER — RESULT REVIEW (OUTPATIENT)
Age: 53
End: 2018-07-02

## 2018-07-02 VITALS
OXYGEN SATURATION: 100 % | DIASTOLIC BLOOD PRESSURE: 67 MMHG | TEMPERATURE: 98 F | RESPIRATION RATE: 17 BRPM | WEIGHT: 199.96 LBS | SYSTOLIC BLOOD PRESSURE: 118 MMHG | HEIGHT: 61.5 IN | HEART RATE: 66 BPM

## 2018-07-02 DIAGNOSIS — Z90.710 ACQUIRED ABSENCE OF BOTH CERVIX AND UTERUS: Chronic | ICD-10-CM

## 2018-07-02 DIAGNOSIS — Z98.890 OTHER SPECIFIED POSTPROCEDURAL STATES: Chronic | ICD-10-CM

## 2018-07-02 DIAGNOSIS — Z98.891 HISTORY OF UTERINE SCAR FROM PREVIOUS SURGERY: Chronic | ICD-10-CM

## 2018-07-02 DIAGNOSIS — I10 ESSENTIAL (PRIMARY) HYPERTENSION: ICD-10-CM

## 2018-07-02 DIAGNOSIS — Z96.651 PRESENCE OF RIGHT ARTIFICIAL KNEE JOINT: Chronic | ICD-10-CM

## 2018-07-02 DIAGNOSIS — M25.661 STIFFNESS OF RIGHT KNEE, NOT ELSEWHERE CLASSIFIED: ICD-10-CM

## 2018-07-02 LAB
ANION GAP SERPL CALC-SCNC: 11 MMOL/L — SIGNIFICANT CHANGE UP (ref 5–17)
B PERT IGG+IGM PNL SER: ABNORMAL
BUN SERPL-MCNC: 14 MG/DL — SIGNIFICANT CHANGE UP (ref 7–18)
CALCIUM SERPL-MCNC: 8.6 MG/DL — SIGNIFICANT CHANGE UP (ref 8.4–10.5)
CHLORIDE SERPL-SCNC: 105 MMOL/L — SIGNIFICANT CHANGE UP (ref 96–108)
CO2 SERPL-SCNC: 22 MMOL/L — SIGNIFICANT CHANGE UP (ref 22–31)
COLOR FLD: YELLOW — SIGNIFICANT CHANGE UP
COMMENT - FLUIDS: SIGNIFICANT CHANGE UP
CREAT SERPL-MCNC: 0.91 MG/DL — SIGNIFICANT CHANGE UP (ref 0.5–1.3)
FLUID INTAKE SUBSTANCE CLASS: SIGNIFICANT CHANGE UP
FLUID SEGMENTED GRANULOCYTES: 4 % — SIGNIFICANT CHANGE UP
GLUCOSE SERPL-MCNC: 133 MG/DL — HIGH (ref 70–99)
GRAM STN FLD: SIGNIFICANT CHANGE UP
HBA1C BLD-MCNC: 6.3 % — HIGH (ref 4–5.6)
HCT VFR BLD CALC: 37.5 % — SIGNIFICANT CHANGE UP (ref 34.5–45)
HGB BLD-MCNC: 11.9 G/DL — SIGNIFICANT CHANGE UP (ref 11.5–15.5)
LYMPHOCYTES # FLD: 58 % — SIGNIFICANT CHANGE UP
MCHC RBC-ENTMCNC: 26.4 PG — LOW (ref 27–34)
MCHC RBC-ENTMCNC: 31.7 GM/DL — LOW (ref 32–36)
MCV RBC AUTO: 83.5 FL — SIGNIFICANT CHANGE UP (ref 80–100)
MESOTHL CELL # FLD: 22 % — SIGNIFICANT CHANGE UP
MONOS+MACROS # FLD: 16 % — SIGNIFICANT CHANGE UP
PLATELET # BLD AUTO: 124 K/UL — LOW (ref 150–400)
POTASSIUM SERPL-MCNC: 3.8 MMOL/L — SIGNIFICANT CHANGE UP (ref 3.5–5.3)
POTASSIUM SERPL-SCNC: 3.8 MMOL/L — SIGNIFICANT CHANGE UP (ref 3.5–5.3)
RBC # BLD: 4.49 M/UL — SIGNIFICANT CHANGE UP (ref 3.8–5.2)
RBC # FLD: 13.6 % — SIGNIFICANT CHANGE UP (ref 10.3–14.5)
RCV VOL RI: 5425 /UL — HIGH (ref 0–5)
SODIUM SERPL-SCNC: 138 MMOL/L — SIGNIFICANT CHANGE UP (ref 135–145)
SPECIMEN SOURCE: SIGNIFICANT CHANGE UP
TOTAL NUCLEATED CELL COUNT, BODY FLUID: 95 /UL — HIGH (ref 0–5)
TUBE TYPE: SIGNIFICANT CHANGE UP
WBC # BLD: 4.7 K/UL — SIGNIFICANT CHANGE UP (ref 3.8–10.5)
WBC # FLD AUTO: 4.7 K/UL — SIGNIFICANT CHANGE UP (ref 3.8–10.5)

## 2018-07-02 PROCEDURE — 88311 DECALCIFY TISSUE: CPT | Mod: 26

## 2018-07-02 PROCEDURE — 73562 X-RAY EXAM OF KNEE 3: CPT | Mod: 26,RT

## 2018-07-02 PROCEDURE — 88331 PATH CONSLTJ SURG 1 BLK 1SPC: CPT | Mod: 26

## 2018-07-02 PROCEDURE — 88305 TISSUE EXAM BY PATHOLOGIST: CPT | Mod: 26

## 2018-07-02 RX ORDER — HYDROCHLOROTHIAZIDE 25 MG
25 TABLET ORAL DAILY
Refills: 0 | Status: DISCONTINUED | OUTPATIENT
Start: 2018-07-02 | End: 2018-07-05

## 2018-07-02 RX ORDER — GABAPENTIN 400 MG/1
100 CAPSULE ORAL THREE TIMES A DAY
Refills: 0 | Status: DISCONTINUED | OUTPATIENT
Start: 2018-07-02 | End: 2018-07-05

## 2018-07-02 RX ORDER — HYDROMORPHONE HYDROCHLORIDE 2 MG/ML
0.5 INJECTION INTRAMUSCULAR; INTRAVENOUS; SUBCUTANEOUS EVERY 4 HOURS
Refills: 0 | Status: DISCONTINUED | OUTPATIENT
Start: 2018-07-02 | End: 2018-07-04

## 2018-07-02 RX ORDER — KETOROLAC TROMETHAMINE 30 MG/ML
30 SYRINGE (ML) INJECTION EVERY 6 HOURS
Refills: 0 | Status: DISCONTINUED | OUTPATIENT
Start: 2018-07-02 | End: 2018-07-05

## 2018-07-02 RX ORDER — ENOXAPARIN SODIUM 100 MG/ML
40 INJECTION SUBCUTANEOUS DAILY
Refills: 0 | Status: DISCONTINUED | OUTPATIENT
Start: 2018-07-02 | End: 2018-07-05

## 2018-07-02 RX ORDER — ACETAMINOPHEN 500 MG
650 TABLET ORAL EVERY 6 HOURS
Refills: 0 | Status: DISCONTINUED | OUTPATIENT
Start: 2018-07-02 | End: 2018-07-02

## 2018-07-02 RX ORDER — AMLODIPINE BESYLATE 2.5 MG/1
5 TABLET ORAL DAILY
Refills: 0 | Status: DISCONTINUED | OUTPATIENT
Start: 2018-07-02 | End: 2018-07-05

## 2018-07-02 RX ORDER — CEFAZOLIN SODIUM 1 G
1000 VIAL (EA) INJECTION EVERY 8 HOURS
Refills: 0 | Status: COMPLETED | OUTPATIENT
Start: 2018-07-02 | End: 2018-07-03

## 2018-07-02 RX ORDER — ONDANSETRON 8 MG/1
4 TABLET, FILM COATED ORAL EVERY 6 HOURS
Refills: 0 | Status: DISCONTINUED | OUTPATIENT
Start: 2018-07-02 | End: 2018-07-05

## 2018-07-02 RX ORDER — DOCUSATE SODIUM 100 MG
100 CAPSULE ORAL THREE TIMES A DAY
Refills: 0 | Status: DISCONTINUED | OUTPATIENT
Start: 2018-07-02 | End: 2018-07-05

## 2018-07-02 RX ORDER — MAGNESIUM HYDROXIDE 400 MG/1
30 TABLET, CHEWABLE ORAL DAILY
Refills: 0 | Status: DISCONTINUED | OUTPATIENT
Start: 2018-07-02 | End: 2018-07-05

## 2018-07-02 RX ORDER — BUPIVACAINE 13.3 MG/ML
20 INJECTION, SUSPENSION, LIPOSOMAL INFILTRATION ONCE
Refills: 0 | Status: COMPLETED | OUTPATIENT
Start: 2018-07-02 | End: 2018-07-02

## 2018-07-02 RX ORDER — LOSARTAN POTASSIUM 100 MG/1
100 TABLET, FILM COATED ORAL DAILY
Refills: 0 | Status: DISCONTINUED | OUTPATIENT
Start: 2018-07-02 | End: 2018-07-05

## 2018-07-02 RX ORDER — FOLIC ACID 0.8 MG
1 TABLET ORAL DAILY
Refills: 0 | Status: DISCONTINUED | OUTPATIENT
Start: 2018-07-02 | End: 2018-07-05

## 2018-07-02 RX ORDER — FERROUS SULFATE 325(65) MG
325 TABLET ORAL
Refills: 0 | Status: DISCONTINUED | OUTPATIENT
Start: 2018-07-02 | End: 2018-07-05

## 2018-07-02 RX ORDER — SENNA PLUS 8.6 MG/1
2 TABLET ORAL AT BEDTIME
Refills: 0 | Status: DISCONTINUED | OUTPATIENT
Start: 2018-07-02 | End: 2018-07-05

## 2018-07-02 RX ORDER — SODIUM CHLORIDE 9 MG/ML
1000 INJECTION, SOLUTION INTRAVENOUS
Refills: 0 | Status: DISCONTINUED | OUTPATIENT
Start: 2018-07-02 | End: 2018-07-05

## 2018-07-02 RX ORDER — ACETAMINOPHEN 500 MG
1000 TABLET ORAL EVERY 8 HOURS
Refills: 0 | Status: DISCONTINUED | OUTPATIENT
Start: 2018-07-02 | End: 2018-07-02

## 2018-07-02 RX ORDER — TRAMADOL HYDROCHLORIDE 50 MG/1
50 TABLET ORAL EVERY 8 HOURS
Refills: 0 | Status: DISCONTINUED | OUTPATIENT
Start: 2018-07-02 | End: 2018-07-04

## 2018-07-02 RX ORDER — ASCORBIC ACID 60 MG
500 TABLET,CHEWABLE ORAL
Refills: 0 | Status: DISCONTINUED | OUTPATIENT
Start: 2018-07-02 | End: 2018-07-05

## 2018-07-02 RX ORDER — ACETAMINOPHEN 500 MG
1000 TABLET ORAL ONCE
Refills: 0 | Status: DISCONTINUED | OUTPATIENT
Start: 2018-07-02 | End: 2018-07-05

## 2018-07-02 RX ADMIN — Medication 30 MILLIGRAM(S): at 20:02

## 2018-07-02 RX ADMIN — Medication 100 MILLIGRAM(S): at 18:24

## 2018-07-02 RX ADMIN — TRAMADOL HYDROCHLORIDE 50 MILLIGRAM(S): 50 TABLET ORAL at 07:33

## 2018-07-02 RX ADMIN — ENOXAPARIN SODIUM 40 MILLIGRAM(S): 100 INJECTION SUBCUTANEOUS at 21:31

## 2018-07-02 RX ADMIN — TRAMADOL HYDROCHLORIDE 50 MILLIGRAM(S): 50 TABLET ORAL at 21:31

## 2018-07-02 RX ADMIN — Medication 100 MILLIGRAM(S): at 21:31

## 2018-07-02 RX ADMIN — Medication 30 MILLIGRAM(S): at 21:33

## 2018-07-02 RX ADMIN — TRAMADOL HYDROCHLORIDE 50 MILLIGRAM(S): 50 TABLET ORAL at 22:15

## 2018-07-02 RX ADMIN — GABAPENTIN 100 MILLIGRAM(S): 400 CAPSULE ORAL at 21:31

## 2018-07-02 RX ADMIN — GABAPENTIN 100 MILLIGRAM(S): 400 CAPSULE ORAL at 07:34

## 2018-07-02 RX ADMIN — SODIUM CHLORIDE 3 MILLILITER(S): 9 INJECTION INTRAMUSCULAR; INTRAVENOUS; SUBCUTANEOUS at 21:33

## 2018-07-02 RX ADMIN — CELECOXIB 200 MILLIGRAM(S): 200 CAPSULE ORAL at 07:34

## 2018-07-02 NOTE — PHYSICAL THERAPY INITIAL EVALUATION ADULT - PERTINENT HX OF CURRENT PROBLEM, REHAB EVAL
Painful right knee, "specially when standing from a sitting position", unable to flex and extend the right lower extremity. Pt. underwent right knee sx. today.

## 2018-07-02 NOTE — PHYSICAL THERAPY INITIAL EVALUATION ADULT - DIAGNOSIS, PT EVAL
Slight decline in functional mobility due to right knee pain, decreased ROM, strength and endurance.

## 2018-07-02 NOTE — PHYSICAL THERAPY INITIAL EVALUATION ADULT - GENERAL OBSERVATIONS, REHAB EVAL
Pt. found lying supine; on IV, rich catheter and SCD's. Pt. denies pain at rest. Right knee dressing/ace wrap c/d/i. Pt. cooperative and motivated during eval.

## 2018-07-02 NOTE — PHYSICAL THERAPY INITIAL EVALUATION ADULT - CRITERIA FOR SKILLED THERAPEUTIC INTERVENTIONS
impairments found/functional limitations in following categories/rehab potential/therapy frequency/predicted duration of therapy intervention/anticipated equipment needs at discharge/anticipated discharge recommendation

## 2018-07-02 NOTE — BRIEF OPERATIVE NOTE - PROCEDURE
<<-----Click on this checkbox to enter Procedure TKA (total knee arthroplasty)  07/02/2018  Revision RTKA  Active  SPILLAI

## 2018-07-02 NOTE — PATIENT PROFILE ADULT. - REASON FOR ADMISSION
Painful right knee, "specially when standing from a sitting position", unable to flex and extend the right lower extremity

## 2018-07-02 NOTE — CONSULT NOTE ADULT - SUBJECTIVE AND OBJECTIVE BOX
Patient is a 53y old  Female who presents with a chief complaint of Painful right knee, "specially when standing from a sitting position", unable to flex and extend the right lower extremity (2018 07:24)       History of Present Illness:  Reason for Admission	Painful right knee, "specially when standing from a sitting position", unable to flex and extend the right lower extremity	    History of Present Illness		  54 yo female with history of HTN, obesity reports the above.      History of Present Illness: As above , awake , alert lying in bed in NAD , complaints of pain in right knee. S/p TKA (total knee arthroplasty)       INTERVAL HPI/OVERNIGHT EVENTS:    T(C): 36.5 (18 @ 17:02), Max: 36.9 (18 @ 07:21)  HR: 99 (18 @ 17:02) (53 - 99)  BP: 130/81 (18 @ 17:02) (100/53 - 132/54)  RR: 18 (18 @ 17:02) (13 - 18)  SpO2: 100% (18 @ 17:02) (96% - 100%)  Wt(kg): --  I&O's Summary    2018 07:01  -  2018 17:38  --------------------------------------------------------  IN: 2270 mL / OUT: 400 mL / NET: 1870 mL        PAST MEDICAL & SURGICAL HISTORY:  Stiffness of right knee, not elsewhere classified  Presence of right artificial knee joint  Hypertension  Primary osteoarthritis of right knee  Menorrhagia with regular cycle  Morbid obesity: BMI- 40  S/P TKR (total knee replacement), right  S/P arthroscopy of right knee: ,   S/P  section  S/P hysterectomy:       SOCIAL HISTORY  Alcohol:  Tobacco:  Illicit substance use:      FAMILY HISTORY:      LABS:                        11.9   4.7   )-----------( 124      ( 2018 15:09 )             37.5     07-02    138  |  105  |  14  ----------------------------<  133<H>  3.8   |  22  |  0.91    Ca    8.6      2018 15:09          CAPILLARY BLOOD GLUCOSE                MEDICATIONS  (STANDING):  acetaminophen  IVPB. 1000 milliGRAM(s) IV Intermittent once  amLODIPine   Tablet 5 milliGRAM(s) Oral daily  ascorbic acid 500 milliGRAM(s) Oral two times a day  BUpivacaine liposome 1.3% Injectable 20 milliLiter(s) Local Injection once  ceFAZolin   IVPB 1000 milliGRAM(s) IV Intermittent every 8 hours  docusate sodium 100 milliGRAM(s) Oral three times a day  enoxaparin Injectable 40 milliGRAM(s) SubCutaneous daily  ferrous    sulfate 325 milliGRAM(s) Oral three times a day with meals  folic acid 1 milliGRAM(s) Oral daily  gabapentin 100 milliGRAM(s) Oral three times a day  hydrochlorothiazide 25 milliGRAM(s) Oral daily  lactated ringers. 1000 milliLiter(s) (130 mL/Hr) IV Continuous <Continuous>  losartan 100 milliGRAM(s) Oral daily  sodium chloride 0.9% lock flush 3 milliLiter(s) IV Push every 8 hours  traMADol 50 milliGRAM(s) Oral every 8 hours    MEDICATIONS  (PRN):  HYDROmorphone  Injectable 0.5 milliGRAM(s) IV Push every 4 hours PRN Severe Pain (7 - 10)  ketorolac   Injectable 30 milliGRAM(s) IV Push every 6 hours PRN Mild Pain (1 - 3)  magnesium hydroxide Suspension 30 milliLiter(s) Oral daily PRN Constipation  ondansetron Injectable 4 milliGRAM(s) IV Push every 6 hours PRN Nausea and/or Vomiting  senna 2 Tablet(s) Oral at bedtime PRN Constipation      REVIEW OF SYSTEMS:  CONSTITUTIONAL: No fever, weight loss, or fatigue  EYES: No eye pain, visual disturbances, or discharge  ENMT:  No difficulty hearing, tinnitus, vertigo; No sinus or throat pain  NECK: No pain or stiffness  RESPIRATORY: No cough, wheezing, chills or hemoptysis; No shortness of breath  CARDIOVASCULAR: No chest pain, palpitations, dizziness, or leg swelling  GASTROINTESTINAL: No abdominal or epigastric pain. No nausea, vomiting, or hematemesis; No diarrhea or constipation. No melena or hematochezia.  GENITOURINARY: No dysuria, frequency, hematuria, or incontinence  NEUROLOGICAL: No headaches, memory loss, loss of strength, numbness, or tremors  SKIN: No itching, burning, rashes, or lesions   LYMPH NODES: No enlarged glands  ENDOCRINE: No heat or cold intolerance; No hair loss  MUSCULOSKELETAL: No joint pain or swelling; No muscle, back, or extremity pain  PSYCHIATRIC: No depression, anxiety, mood swings, or difficulty sleeping  HEME/LYMPH: No easy bruising, or bleeding gums  ALLERY AND IMMUNOLOGIC: No hives or eczema    RADIOLOGY & ADDITIONAL TESTS:  < from: Xray Knee 3 Views, Right (18 @ 15:06) >    FINDINGS:  Total knee replacement noted in gross anatomic alignment with post   operative changes.     IMPRESSION:  Post operative changes.     < end of copied text >    Imaging Personally Reviewed:  [x ] YES  [ ] NO    Consultant(s) Notes Reviewed:  [x ] YES  [ ] NO    PHYSICAL EXAM:  GENERAL: NAD, well-groomed, well-developed  HEAD:  Atraumatic, Normocephalic  EYES: EOMI, PERRLA, conjunctiva and sclera clear  ENMT: No tonsillar erythema, exudates, or enlargement; Moist mucous membranes, Good dentition, No lesions  NECK: Supple, No JVD, Normal thyroid  NERVOUS SYSTEM:  Alert & Oriented X3, Good concentration; Motor Strength 5/5 B/L upper and lower extremities; DTRs 2+ intact and symmetric  CHEST/LUNG: Clear to percussion bilaterally; No rales, rhonchi, wheezing, or rubs  HEART: Regular rate and rhythm; No murmurs, rubs, or gallops  ABDOMEN: Soft, Nontender, Nondistended; Bowel sounds present  EXTREMITIES:  + edema right knee , +pain right knee , dressing clean.  LYMPH: No lymphadenopathy noted  SKIN: No rashes or lesions    Care Discussed with Consultants/Other Providers [ ] YES  [ ] NO Patient is a 53y old  Female who presents with a chief complaint of Painful right knee, "specially when standing from a sitting position", unable to flex and extend the right lower extremity (2018 07:24)       History of Present Illness:  Reason for Admission	Painful right knee, "specially when standing from a sitting position", unable to flex and extend the right lower extremity	    History of Present Illness		  54 yo female with history of HTN, obesity reports the above.      History of Present Illness: As above , awake , alert lying in bed in NAD , complaints of slight right knee pain S/p TKA (total knee arthroplasty)       INTERVAL HPI/OVERNIGHT EVENTS:    T(C): 36.5 (18 @ 17:02), Max: 36.9 (18 @ 07:21)  HR: 99 (18 @ 17:02) (53 - 99)  BP: 130/81 (18 @ 17:02) (100/53 - 132/54)  RR: 18 (18 @ 17:02) (13 - 18)  SpO2: 100% (18 @ 17:02) (96% - 100%)  Wt(kg): --  I&O's Summary    2018 07:01  -  2018 17:38  --------------------------------------------------------  IN: 2270 mL / OUT: 400 mL / NET: 1870 mL        PAST MEDICAL & SURGICAL HISTORY:  Stiffness of right knee, not elsewhere classified  Presence of right artificial knee joint  Hypertension  Primary osteoarthritis of right knee  Menorrhagia with regular cycle  Morbid obesity: BMI- 40  S/P TKR (total knee replacement), right  S/P arthroscopy of right knee: ,   S/P  section  S/P hysterectomy:       SOCIAL HISTORY  Alcohol:  Tobacco:  Illicit substance use:      FAMILY HISTORY:      LABS:                        11.9   4.7   )-----------( 124      ( 2018 15:09 )             37.5     07-02    138  |  105  |  14  ----------------------------<  133<H>  3.8   |  22  |  0.91    Ca    8.6      2018 15:09          CAPILLARY BLOOD GLUCOSE                MEDICATIONS  (STANDING):  acetaminophen  IVPB. 1000 milliGRAM(s) IV Intermittent once  amLODIPine   Tablet 5 milliGRAM(s) Oral daily  ascorbic acid 500 milliGRAM(s) Oral two times a day  BUpivacaine liposome 1.3% Injectable 20 milliLiter(s) Local Injection once  ceFAZolin   IVPB 1000 milliGRAM(s) IV Intermittent every 8 hours  docusate sodium 100 milliGRAM(s) Oral three times a day  enoxaparin Injectable 40 milliGRAM(s) SubCutaneous daily  ferrous    sulfate 325 milliGRAM(s) Oral three times a day with meals  folic acid 1 milliGRAM(s) Oral daily  gabapentin 100 milliGRAM(s) Oral three times a day  hydrochlorothiazide 25 milliGRAM(s) Oral daily  lactated ringers. 1000 milliLiter(s) (130 mL/Hr) IV Continuous <Continuous>  losartan 100 milliGRAM(s) Oral daily  sodium chloride 0.9% lock flush 3 milliLiter(s) IV Push every 8 hours  traMADol 50 milliGRAM(s) Oral every 8 hours    MEDICATIONS  (PRN):  HYDROmorphone  Injectable 0.5 milliGRAM(s) IV Push every 4 hours PRN Severe Pain (7 - 10)  ketorolac   Injectable 30 milliGRAM(s) IV Push every 6 hours PRN Mild Pain (1 - 3)  magnesium hydroxide Suspension 30 milliLiter(s) Oral daily PRN Constipation  ondansetron Injectable 4 milliGRAM(s) IV Push every 6 hours PRN Nausea and/or Vomiting  senna 2 Tablet(s) Oral at bedtime PRN Constipation      REVIEW OF SYSTEMS:  CONSTITUTIONAL: No fever, weight loss, or fatigue  EYES: No eye pain, visual disturbances, or discharge  ENMT:  No difficulty hearing, tinnitus, vertigo; No sinus or throat pain  NECK: No pain or stiffness  RESPIRATORY: No cough, wheezing, chills or hemoptysis; No shortness of breath  CARDIOVASCULAR: No chest pain, palpitations, dizziness, or leg swelling  GASTROINTESTINAL: No abdominal or epigastric pain. No nausea, vomiting, or hematemesis; No diarrhea or constipation. No melena or hematochezia.  GENITOURINARY: No dysuria, frequency, hematuria, or incontinence  NEUROLOGICAL: No headaches, memory loss, loss of strength, numbness, or tremors  SKIN: No itching, burning, rashes, or lesions   LYMPH NODES: No enlarged glands  ENDOCRINE: No heat or cold intolerance; No hair loss  MUSCULOSKELETAL:  joint pain or swelling; No muscle, back, or extremity pain  PSYCHIATRIC: No depression, anxiety, mood swings, or difficulty sleeping  HEME/LYMPH: No easy bruising, or bleeding gums  ALLERY AND IMMUNOLOGIC: No hives or eczema    RADIOLOGY & ADDITIONAL TESTS:  < from: Xray Knee 3 Views, Right (18 @ 15:06) >    FINDINGS:  Total knee replacement noted in gross anatomic alignment with post   operative changes.     IMPRESSION:  Post operative changes.     < end of copied text >    Imaging Personally Reviewed:  [x ] YES  [ ] NO    Consultant(s) Notes Reviewed:  [x ] YES  [ ] NO    PHYSICAL EXAM:  GENERAL: NAD, well-groomed, well-developed  HEAD:  Atraumatic, Normocephalic  EYES: EOMI, PERRLA, conjunctiva and sclera clear  ENMT: No tonsillar erythema, exudates, or enlargement; Moist mucous membranes, Good dentition, No lesions  NECK: Supple, No JVD, Normal thyroid  NERVOUS SYSTEM:  Alert & Oriented X3, Good concentration; Motor Strength 5/5 B/L upper and lower extremities; DTRs 2+ intact and symmetric  CHEST/LUNG: Clear to percussion bilaterally; No rales, rhonchi, wheezing, or rubs  HEART: Regular rate and rhythm; No murmurs, rubs, or gallops  ABDOMEN: Soft, Nontender, Nondistended; Bowel sounds present  EXTREMITIES:  + edema right knee , +pain right knee , dressing clean.  LYMPH: No lymphadenopathy noted  SKIN: No rashes or lesions    Care Discussed with Consultants/Other Providers [ ] YES  [ ] NO

## 2018-07-02 NOTE — PHYSICAL THERAPY INITIAL EVALUATION ADULT - GAIT DEVIATIONS NOTED, PT EVAL
decreased velocity of limb motion/decreased step length/decreased stride length/decreased weight-shifting ability

## 2018-07-03 DIAGNOSIS — M17.11 UNILATERAL PRIMARY OSTEOARTHRITIS, RIGHT KNEE: ICD-10-CM

## 2018-07-03 DIAGNOSIS — Z96.651 PRESENCE OF RIGHT ARTIFICIAL KNEE JOINT: ICD-10-CM

## 2018-07-03 DIAGNOSIS — M25.561 PAIN IN RIGHT KNEE: ICD-10-CM

## 2018-07-03 DIAGNOSIS — E66.01 MORBID (SEVERE) OBESITY DUE TO EXCESS CALORIES: ICD-10-CM

## 2018-07-03 LAB
ANION GAP SERPL CALC-SCNC: 10 MMOL/L — SIGNIFICANT CHANGE UP (ref 5–17)
BUN SERPL-MCNC: 13 MG/DL — SIGNIFICANT CHANGE UP (ref 7–18)
CALCIUM SERPL-MCNC: 9.4 MG/DL — SIGNIFICANT CHANGE UP (ref 8.4–10.5)
CHLORIDE SERPL-SCNC: 101 MMOL/L — SIGNIFICANT CHANGE UP (ref 96–108)
CO2 SERPL-SCNC: 27 MMOL/L — SIGNIFICANT CHANGE UP (ref 22–31)
CREAT SERPL-MCNC: 0.91 MG/DL — SIGNIFICANT CHANGE UP (ref 0.5–1.3)
GLUCOSE SERPL-MCNC: 102 MG/DL — HIGH (ref 70–99)
HCT VFR BLD CALC: 36.8 % — SIGNIFICANT CHANGE UP (ref 34.5–45)
HGB BLD-MCNC: 11.3 G/DL — LOW (ref 11.5–15.5)
MCHC RBC-ENTMCNC: 25.4 PG — LOW (ref 27–34)
MCHC RBC-ENTMCNC: 30.7 GM/DL — LOW (ref 32–36)
MCV RBC AUTO: 82.6 FL — SIGNIFICANT CHANGE UP (ref 80–100)
PLATELET # BLD AUTO: 185 K/UL — SIGNIFICANT CHANGE UP (ref 150–400)
POTASSIUM SERPL-MCNC: 3.6 MMOL/L — SIGNIFICANT CHANGE UP (ref 3.5–5.3)
POTASSIUM SERPL-SCNC: 3.6 MMOL/L — SIGNIFICANT CHANGE UP (ref 3.5–5.3)
RBC # BLD: 4.46 M/UL — SIGNIFICANT CHANGE UP (ref 3.8–5.2)
RBC # FLD: 13.1 % — SIGNIFICANT CHANGE UP (ref 10.3–14.5)
SODIUM SERPL-SCNC: 138 MMOL/L — SIGNIFICANT CHANGE UP (ref 135–145)
WBC # BLD: 10.8 K/UL — HIGH (ref 3.8–10.5)
WBC # FLD AUTO: 10.8 K/UL — HIGH (ref 3.8–10.5)

## 2018-07-03 PROCEDURE — 99223 1ST HOSP IP/OBS HIGH 75: CPT

## 2018-07-03 RX ADMIN — GABAPENTIN 100 MILLIGRAM(S): 400 CAPSULE ORAL at 21:28

## 2018-07-03 RX ADMIN — Medication 325 MILLIGRAM(S): at 17:50

## 2018-07-03 RX ADMIN — SODIUM CHLORIDE 3 MILLILITER(S): 9 INJECTION INTRAMUSCULAR; INTRAVENOUS; SUBCUTANEOUS at 13:20

## 2018-07-03 RX ADMIN — HYDROMORPHONE HYDROCHLORIDE 0.5 MILLIGRAM(S): 2 INJECTION INTRAMUSCULAR; INTRAVENOUS; SUBCUTANEOUS at 19:45

## 2018-07-03 RX ADMIN — Medication 100 MILLIGRAM(S): at 02:15

## 2018-07-03 RX ADMIN — ENOXAPARIN SODIUM 40 MILLIGRAM(S): 100 INJECTION SUBCUTANEOUS at 12:06

## 2018-07-03 RX ADMIN — HYDROMORPHONE HYDROCHLORIDE 0.5 MILLIGRAM(S): 2 INJECTION INTRAMUSCULAR; INTRAVENOUS; SUBCUTANEOUS at 19:29

## 2018-07-03 RX ADMIN — Medication 1 MILLIGRAM(S): at 12:06

## 2018-07-03 RX ADMIN — TRAMADOL HYDROCHLORIDE 50 MILLIGRAM(S): 50 TABLET ORAL at 05:34

## 2018-07-03 RX ADMIN — SODIUM CHLORIDE 130 MILLILITER(S): 9 INJECTION, SOLUTION INTRAVENOUS at 06:36

## 2018-07-03 RX ADMIN — Medication 25 MILLIGRAM(S): at 05:34

## 2018-07-03 RX ADMIN — Medication 30 MILLIGRAM(S): at 11:57

## 2018-07-03 RX ADMIN — TRAMADOL HYDROCHLORIDE 50 MILLIGRAM(S): 50 TABLET ORAL at 14:08

## 2018-07-03 RX ADMIN — TRAMADOL HYDROCHLORIDE 50 MILLIGRAM(S): 50 TABLET ORAL at 22:36

## 2018-07-03 RX ADMIN — Medication 100 MILLIGRAM(S): at 05:33

## 2018-07-03 RX ADMIN — Medication 500 MILLIGRAM(S): at 17:50

## 2018-07-03 RX ADMIN — Medication 100 MILLIGRAM(S): at 13:20

## 2018-07-03 RX ADMIN — TRAMADOL HYDROCHLORIDE 50 MILLIGRAM(S): 50 TABLET ORAL at 13:20

## 2018-07-03 RX ADMIN — Medication 325 MILLIGRAM(S): at 12:06

## 2018-07-03 RX ADMIN — TRAMADOL HYDROCHLORIDE 50 MILLIGRAM(S): 50 TABLET ORAL at 06:35

## 2018-07-03 RX ADMIN — GABAPENTIN 100 MILLIGRAM(S): 400 CAPSULE ORAL at 05:33

## 2018-07-03 RX ADMIN — GABAPENTIN 100 MILLIGRAM(S): 400 CAPSULE ORAL at 13:20

## 2018-07-03 RX ADMIN — TRAMADOL HYDROCHLORIDE 50 MILLIGRAM(S): 50 TABLET ORAL at 21:28

## 2018-07-03 RX ADMIN — Medication 30 MILLIGRAM(S): at 12:12

## 2018-07-03 RX ADMIN — Medication 500 MILLIGRAM(S): at 05:33

## 2018-07-03 RX ADMIN — SODIUM CHLORIDE 3 MILLILITER(S): 9 INJECTION INTRAMUSCULAR; INTRAVENOUS; SUBCUTANEOUS at 05:27

## 2018-07-03 RX ADMIN — AMLODIPINE BESYLATE 5 MILLIGRAM(S): 2.5 TABLET ORAL at 05:34

## 2018-07-03 RX ADMIN — SODIUM CHLORIDE 3 MILLILITER(S): 9 INJECTION INTRAMUSCULAR; INTRAVENOUS; SUBCUTANEOUS at 21:25

## 2018-07-03 RX ADMIN — Medication 325 MILLIGRAM(S): at 09:00

## 2018-07-03 RX ADMIN — MAGNESIUM HYDROXIDE 30 MILLILITER(S): 400 TABLET, CHEWABLE ORAL at 21:27

## 2018-07-03 NOTE — PROGRESS NOTE ADULT - PROBLEM SELECTOR PLAN 1
53yFemale S/p R Revision Total Knee Replacement POD# 1  - Pain control  - DVT ppx   - Daily PT-WBAT to RLE  - C/w 24 Hr IV antibx postop  - Heel bump to RLE

## 2018-07-03 NOTE — PROGRESS NOTE ADULT - SUBJECTIVE AND OBJECTIVE BOX
53yFemale    Diagnosis:  S/p R Revision Total Knee Replacement POD# 1    Patient was seen and evaluated at bedside. Patient with no acute complaints.   Pain is well controlled. Zachery removed this AM.   Denies CP/SOB, dyspnea, paresthesias, N/V/D, palpitations.     Vital Signs Last 24 Hrs  T(C): 36.4 (03 Jul 2018 05:27), Max: 37 (03 Jul 2018 00:21)  T(F): 97.5 (03 Jul 2018 05:27), Max: 98.6 (03 Jul 2018 00:21)  HR: 56 (03 Jul 2018 05:27) (53 - 99)  BP: 112/56 (03 Jul 2018 05:27) (100/53 - 132/54)  BP(mean): --  RR: 16 (03 Jul 2018 05:27) (13 - 18)  SpO2: 100% (03 Jul 2018 05:27) (96% - 100%)  I&O's Detail    02 Jul 2018 07:01  -  03 Jul 2018 07:00  --------------------------------------------------------  IN:    Lactated Ringers IV Bolus: 2000 mL    lactated ringers.: 270 mL  Total IN: 2270 mL    OUT:    Indwelling Catheter - Urethral: 1500 mL  Total OUT: 1500 mL    Total NET: 770 mL    Physical Exam:    General: AAOx3, NAD, resting comfortably in bed.    R KNEE:  Dressing is C/D/I. Skin is pink and warm. No erythema. SILT.  No swelling.   Lower extremity:  No calf tenderness, calves are soft. 2+pulses. NVI. (+) EHL/FHL/ADF/APF.  Good capillary refill.                           11.9   4.7   )-----------( 124      ( 02 Jul 2018 15:09 )             37.5     07-02    138  |  105  |  14  ----------------------------<  133<H>  3.8   |  22  |  0.91    Ca    8.6      02 Jul 2018 15:09        Impression:  53yFemale S/p R Revision Total Knee Replacement POD# 1  Plan:  -  Pain management  -  Dvt prophylaxis  -  Daily Physical Therapy:  WBAT  to RLE  -  Discharge planning: Home Vs. Rehab pending Physical therapy eval.  -  Heel bump to RLE  -  Incentive Spirometer  -  Continue with Post-op Antibiotics x 24hrs  -  Discontinued Bingham catheter today  -  F/u AM labs  -  Case d/w DR. Holbrook

## 2018-07-03 NOTE — PROGRESS NOTE ADULT - PROBLEM SELECTOR PLAN 1
Revision of Right Total Knee Replacement.  Pain management  Surgery follow up. Monitor BP  Cont meds

## 2018-07-03 NOTE — PROGRESS NOTE ADULT - PROBLEM SELECTOR PLAN 1
- iv acetaminophen for 3 doses  - tramadol 30mg po q 8  - toradol iv prn  - no oxycodone - + allergy to percocet  - gabapentin 100mg po tid  - stool softeners - iv acetaminophen for 3 doses  - tramadol 50mg po q 8  - toradol iv prn  - no oxycodone - + allergy to percocet  - gabapentin 100mg po tid  - stool softeners

## 2018-07-03 NOTE — PROGRESS NOTE ADULT - SUBJECTIVE AND OBJECTIVE BOX
pt seen in icu [  ], reg med floor [  x ], bed [ x ], chair at bedside [   ]    Awake ,alert comfortable in bed in NAD. Feeling better . Still with edema on right knee S/P  TKR    REVIEW OF SYSTEMS:    CONSTITUTIONAL: No weakness, fevers or chills  EYES/ENT: No visual changes;  No vertigo or throat pain   NECK: No pain or stiffness  RESPIRATORY: No cough, wheezing, hemoptysis; No shortness of breath  CARDIOVASCULAR: No chest pain or palpitations  GASTROINTESTINAL: No abdominal or epigastric pain. No nausea, vomiting, or hematemesis; No diarrhea or constipation. No melena or hematochezia.  GENITOURINARY: No dysuria, frequency or hematuria  NEUROLOGICAL: No numbness or weakness  SKIN: No itching, burning, rashes, or lesions   All other review of systems is negative unless indicated above.    Physical Exam    General: WN/WD NAD  Neurology: A&Ox3, nonfocal, GAXIOLA x 4  Respiratory: CTA B/L  CV: RRR, S1S2, no murmurs, rubs or gallops  Abdominal: Soft, NT, ND +BS, Last BM  Extremities: + edema right knee , +pain right knee , dressing clean.      Allergies  Percocet 5/325 (Hives; Rash)  strawberry (Rash; Hives)      Health Issues  Stiffness of right knee, not elsewhere classified  Stiffness of right knee, not elsewhere classified  Presence of right artificial knee joint  Stiffness of knee joint, right  Hypertension  Primary osteoarthritis of right knee  Menorrhagia with regular cycle  Morbid obesity  S/P TKR (total knee replacement), right  S/P arthroscopy of right knee  S/P  section  S/P hysterectomy      Vitals  T(F): 97.5 (18 @ 05:27), Max: 98.6 (18 @ 00:21)  HR: 56 (18 @ 05:27) (53 - 99)  BP: 112/56 (18 @ 05:27) (100/53 - 132/54)  RR: 16 (18 @ 05:27) (13 - 18)  SpO2: 100% (18 @ 05:27) (96% - 100%)  Wt(kg): --  CAPILLARY BLOOD GLUCOSE          Labs                          11.3   10.8  )-----------( 185      ( 2018 09:16 )             36.8       07-03    138  |  101  |  13  ----------------------------<  102<H>  3.6   |  27  |  0.91    Ca    9.4      2018 09:16              Radiology Results  < from: Xray Knee 3 Views, Right (.18 @ 15:06) >  FINDINGS:  Total knee replacement noted in gross anatomic alignment with post   operative changes.     IMPRESSION:  Post operative changes.     < end of copied text >    < from: CT Lower Extremity No Cont, Right (. @ 08:58) >    IMPRESSION:   1.  Status post total knee arthroplasty with moderate knee joint   effusion. Postsurgical changes of the distal quadriceps tendon.  2.  No periprosthetic fracture or lucency to suggest loosening.  3.  No intramuscular hematoma is clinically questioned.    < end of copied text >      Meds    MEDICATIONS  (STANDING):  acetaminophen  IVPB. 1000 milliGRAM(s) IV Intermittent once  amLODIPine   Tablet 5 milliGRAM(s) Oral daily  ascorbic acid 500 milliGRAM(s) Oral two times a day  BUpivacaine liposome 1.3% Injectable 20 milliLiter(s) Local Injection once  docusate sodium 100 milliGRAM(s) Oral three times a day  enoxaparin Injectable 40 milliGRAM(s) SubCutaneous daily  ferrous    sulfate 325 milliGRAM(s) Oral three times a day with meals  folic acid 1 milliGRAM(s) Oral daily  gabapentin 100 milliGRAM(s) Oral three times a day  hydrochlorothiazide 25 milliGRAM(s) Oral daily  lactated ringers. 1000 milliLiter(s) (130 mL/Hr) IV Continuous <Continuous>  losartan 100 milliGRAM(s) Oral daily  sodium chloride 0.9% lock flush 3 milliLiter(s) IV Push every 8 hours  traMADol 50 milliGRAM(s) Oral every 8 hours      MEDICATIONS  (PRN):  HYDROmorphone  Injectable 0.5 milliGRAM(s) IV Push every 4 hours PRN Severe Pain (7 - 10)  ketorolac   Injectable 30 milliGRAM(s) IV Push every 6 hours PRN Mild Pain (1 - 3)  magnesium hydroxide Suspension 30 milliLiter(s) Oral daily PRN Constipation  ondansetron Injectable 4 milliGRAM(s) IV Push every 6 hours PRN Nausea and/or Vomiting  senna 2 Tablet(s) Oral at bedtime PRN Constipation pt seen in icu [  ], reg med floor [  x ], bed [  ], chair at bedside [ x  ]  Awake ,alert, comfortable in bed in NAD. Feeling better . Still with edema on right knee. S/P revision of her right TKR    REVIEW OF SYSTEMS:    CONSTITUTIONAL: No weakness, fevers or chills  EYES/ENT: No visual changes;  No vertigo or throat pain   NECK: No pain or stiffness  RESPIRATORY: No cough, wheezing, hemoptysis; No shortness of breath  CARDIOVASCULAR: No chest pain or palpitations  GASTROINTESTINAL: No abdominal or epigastric pain. No nausea, vomiting, or hematemesis; No diarrhea or constipation. No melena or hematochezia.  GENITOURINARY: No dysuria, frequency or hematuria  NEUROLOGICAL: No numbness or weakness  SKIN: No itching, burning, rashes, or lesions   All other review of systems is negative unless indicated above.    Physical Exam    General: WN/WD NAD  Neurology: A&Ox3, nonfocal, GAXIOLA x 4  Respiratory: CTA B/L  CV: RRR, S1S2, no murmurs, rubs or gallops  Abdominal: Soft, NT, ND +BS, Last BM  Extremities: + edema right knee , +pain right knee , dressing clean.      Allergies  Percocet 5/325 (Hives; Rash)  strawberry (Rash; Hives)      Health Issues  Stiffness of right knee, not elsewhere classified  Stiffness of right knee, not elsewhere classified  Presence of right artificial knee joint  Stiffness of knee joint, right  Hypertension  Primary osteoarthritis of right knee  Menorrhagia with regular cycle  Morbid obesity  S/P TKR (total knee replacement), right  S/P arthroscopy of right knee  S/P  section  S/P hysterectomy      Vitals  T(F): 97.5 (18 @ 05:27), Max: 98.6 (18 @ 00:21)  HR: 56 (18 @ 05:27) (53 - 99)  BP: 112/56 (18 @ 05:27) (100/53 - 132/54)  RR: 16 (18 @ 05:27) (13 - 18)  SpO2: 100% (18 @ 05:27) (96% - 100%)  Wt(kg): --  CAPILLARY BLOOD GLUCOSE          Labs                          11.3   10.8  )-----------( 185      ( 2018 09:16 )             36.8       07-03    138  |  101  |  13  ----------------------------<  102<H>  3.6   |  27  |  0.91    Ca    9.4      2018 09:16              Radiology Results  < from: Xray Knee 3 Views, Right (18 @ 15:06) >  FINDINGS:  Total knee replacement noted in gross anatomic alignment with post   operative changes.     IMPRESSION:  Post operative changes.     < end of copied text >    < from: CT Lower Extremity No Cont, Right (17 @ 08:58) >    IMPRESSION:   1.  Status post total knee arthroplasty with moderate knee joint   effusion. Postsurgical changes of the distal quadriceps tendon.  2.  No periprosthetic fracture or lucency to suggest loosening.  3.  No intramuscular hematoma is clinically questioned.    < end of copied text >      Meds    MEDICATIONS  (STANDING):  acetaminophen  IVPB. 1000 milliGRAM(s) IV Intermittent once  amLODIPine   Tablet 5 milliGRAM(s) Oral daily  ascorbic acid 500 milliGRAM(s) Oral two times a day  BUpivacaine liposome 1.3% Injectable 20 milliLiter(s) Local Injection once  docusate sodium 100 milliGRAM(s) Oral three times a day  enoxaparin Injectable 40 milliGRAM(s) SubCutaneous daily  ferrous    sulfate 325 milliGRAM(s) Oral three times a day with meals  folic acid 1 milliGRAM(s) Oral daily  gabapentin 100 milliGRAM(s) Oral three times a day  hydrochlorothiazide 25 milliGRAM(s) Oral daily  lactated ringers. 1000 milliLiter(s) (130 mL/Hr) IV Continuous <Continuous>  losartan 100 milliGRAM(s) Oral daily  sodium chloride 0.9% lock flush 3 milliLiter(s) IV Push every 8 hours  traMADol 50 milliGRAM(s) Oral every 8 hours      MEDICATIONS  (PRN):  HYDROmorphone  Injectable 0.5 milliGRAM(s) IV Push every 4 hours PRN Severe Pain (7 - 10)  ketorolac   Injectable 30 milliGRAM(s) IV Push every 6 hours PRN Mild Pain (1 - 3)  magnesium hydroxide Suspension 30 milliLiter(s) Oral daily PRN Constipation  ondansetron Injectable 4 milliGRAM(s) IV Push every 6 hours PRN Nausea and/or Vomiting  senna 2 Tablet(s) Oral at bedtime PRN Constipation

## 2018-07-03 NOTE — PROGRESS NOTE ADULT - SUBJECTIVE AND OBJECTIVE BOX
JORDY  53y  Female      Patient is a 53y old  Female who presents with a chief complaint of Painful right knee, "specially when standing from a sitting position", unable to flex and extend the right lower extremity (2018 07:24).  Pt s/p revision of right TKR, pod#1.  Pt complaining of mild right knee pain. Pain worsens with movement.  No nausea or vomiting. Pt ambulating with PT>          PAST MEDICAL/SURGICAL HISTORY  PAST MEDICAL & SURGICAL HISTORY:  Stiffness of right knee, not elsewhere classified  Presence of right artificial knee joint  Hypertension  Primary osteoarthritis of right knee  Menorrhagia with regular cycle  Morbid obesity: BMI- 40  S/P TKR (total knee replacement), right  S/P arthroscopy of right knee: ,   S/P  section  S/P hysterectomy:       REVIEW OF SYSTEMS:  CONSTITUTIONAL: No fever, weight loss, or fatigue  EYES: No eye pain, visual disturbances, or discharge  ENMT:  No difficulty hearing, tinnitus, vertigo; No sinus or throat pain  NECK: No pain or stiffness  RESPIRATORY: No cough, wheezing, chills or hemoptysis; No shortness of breath  CARDIOVASCULAR: No chest pain, palpitations, dizziness, or leg swelling  GASTROINTESTINAL: No abdominal or epigastric pain. No nausea, vomiting, or hematemesis; No diarrhea or constipation. No melena or hematochezia.  GENITOURINARY: No dysuria, frequency, hematuria, or incontinence  NEUROLOGICAL: No headaches, memory loss, loss of strength, numbness, or tremors  SKIN: No itching, burning, rashes, or lesions   MUSCULOSKELETAL: + right knee pain      T(C): 36.4 (18 @ 05:27), Max: 37 (18 @ 00:21)  HR: 56 (18 @ 05:27) (53 - 99)  BP: 112/56 (18 @ 05:27) (100/53 - 132/54)  RR: 16 (18 @ 05:27) (13 - 18)  SpO2: 100% (18 @ 05:27) (96% - 100%)  Wt(kg): --Vital Signs Last 24 Hrs  T(C): 36.4 (2018 05:27), Max: 37 (2018 00:21)  T(F): 97.5 (2018 05:27), Max: 98.6 (2018 00:21)  HR: 56 (2018 05:27) (53 - 99)  BP: 112/56 (2018 05:27) (100/53 - 132/54)  BP(mean): --  RR: 16 (2018 05:27) (13 - 18)  SpO2: 100% (2018 05:27) (96% - 100%)    PHYSICAL EXAM:  GENERAL: NAD, well-groomed, well-developed  HEAD:  Atraumatic, Normocephalic  EYES: EOMI, PERRLA, conjunctiva and sclera clear  ENMT: No tonsillar erythema, exudates, or enlargement; Moist mucous membranes, Good dentition, No lesions  NECK: Supple, No JVD, Normal thyroid  NERVOUS SYSTEM:  Alert & Oriented X3, Good concentration; Motor Strength 5/5 B/L upper and lower extremities; DTRs 2+ intact and symmetric  CHEST/LUNG: Clear to percussion bilaterally; No rales, rhonchi, wheezing, or rubs  HEART: Regular rate and rhythm; No murmurs, rubs, or gallops  ABDOMEN: Soft, Nontender, Nondistended; Bowel sounds present  EXTREMITIES:  2+ Peripheral Pulses, No clubbing, cyanosis, or edema  LYMPH: No lymphadenopathy noted  SKIN: No rashes or lesions  MUSCULOSKELETAL: + right knee - limited rom, decreased rom    Consultant(s) Notes Reviewed:  [x ] YES  [ ] NO  Care Discussed with Consultants/Other Providers [ x] YES  [ ] NO    LABS:  CBC   18 @ 09:16  Hematcorit 36.8  Hemoglobin 11.3  Mean Cell Hemoglobin 25.4  Platelet Count-Automated 185  RBC Count 4.46  Red Cell Distrib Width 13.1  Wbc Count 10.8  18 @ 15:09  Hematcorit 37.5  Hemoglobin 11.9  Mean Cell Hemoglobin 26.4  Platelet Count-Automated 124  RBC Count 4.49  Red Cell Distrib Width 13.6  Wbc Count 4.7      BMP  18 @ 09:16  Anion Gap. Serum 10  Blood Urea Nitrogen,Serm 13  Calcium, Total Serum 9.4  Carbon Dioxide, Serum 27  Chloride, Serum 101  Creatinine, Serum 0.91  eGFR in  83  eGFR in Non Afican American 72  Gloucose, serum 102  Potassium, Serum 3.6  Sodium, Serum 138              18 @ 15:09  Anion Gap. Serum 11  Blood Urea Nitrogen,Serm 14  Calcium, Total Serum 8.6  Carbon Dioxide, Serum 22  Chloride, Serum 105  Creatinine, Serum 0.91  eGFR in  83  eGFR in Non Afican American 72  Gloucose, serum 133  Potassium, Serum 3.8  Sodium, Serum 138                  CMP  18 @ 09:16  Lissy Aminotransferase(ALT/SGPT)--  Albumin, Serum --  Alkaline Phosphatase, Serum --  Anion Gap, Serum 10  Aspartate Aminotransferase (AST/SGOT)--  Bilirubin Total, Serum --  Blood Urea Nitrogen, Serum 13  Calcium,Total Serum 9.4  Carbon Dioxide, Serum 27  Chloride, Serum 101  Creatinine, Serum 0.91  eGFR if  83  eGFR if Non African American 72  Glucose, Serum 102  Potassium, Serum 3.6  Protein Total, Serum --  Sodium, Serum 138                      18 @ 15:09  Lissy Aminotransferase(ALT/SGPT)--  Albumin, Serum --  Alkaline Phosphatase, Serum --  Anion Gap, Serum 11  Aspartate Aminotransferase (AST/SGOT)--  Bilirubin Total, Serum --  Blood Urea Nitrogen, Serum 14  Calcium,Total Serum 8.6  Carbon Dioxide, Serum 22  Chloride, Serum 105  Creatinine, Serum 0.91  eGFR if  83  eGFR if Non African American 72  Glucose, Serum 133  Potassium, Serum 3.8  Protein Total, Serum --  Sodium, Serum 138                          PT/INR      Amylase/Lipase            RADIOLOGY & ADDITIONAL TESTS:    Imaging Personally Reviewed:  [ ] YES  [ ] NO

## 2018-07-04 LAB
ANION GAP SERPL CALC-SCNC: 7 MMOL/L — SIGNIFICANT CHANGE UP (ref 5–17)
BUN SERPL-MCNC: 14 MG/DL — SIGNIFICANT CHANGE UP (ref 7–18)
CALCIUM SERPL-MCNC: 8.7 MG/DL — SIGNIFICANT CHANGE UP (ref 8.4–10.5)
CHLORIDE SERPL-SCNC: 102 MMOL/L — SIGNIFICANT CHANGE UP (ref 96–108)
CO2 SERPL-SCNC: 29 MMOL/L — SIGNIFICANT CHANGE UP (ref 22–31)
CREAT SERPL-MCNC: 0.82 MG/DL — SIGNIFICANT CHANGE UP (ref 0.5–1.3)
CULTURE RESULTS: SIGNIFICANT CHANGE UP
GLUCOSE SERPL-MCNC: 129 MG/DL — HIGH (ref 70–99)
HCT VFR BLD CALC: 32 % — LOW (ref 34.5–45)
HGB BLD-MCNC: 10 G/DL — LOW (ref 11.5–15.5)
MCHC RBC-ENTMCNC: 25.6 PG — LOW (ref 27–34)
MCHC RBC-ENTMCNC: 31.2 GM/DL — LOW (ref 32–36)
MCV RBC AUTO: 82.1 FL — SIGNIFICANT CHANGE UP (ref 80–100)
PLATELET # BLD AUTO: 148 K/UL — LOW (ref 150–400)
POTASSIUM SERPL-MCNC: 3.5 MMOL/L — SIGNIFICANT CHANGE UP (ref 3.5–5.3)
POTASSIUM SERPL-SCNC: 3.5 MMOL/L — SIGNIFICANT CHANGE UP (ref 3.5–5.3)
RBC # BLD: 3.9 M/UL — SIGNIFICANT CHANGE UP (ref 3.8–5.2)
RBC # FLD: 13.2 % — SIGNIFICANT CHANGE UP (ref 10.3–14.5)
SODIUM SERPL-SCNC: 138 MMOL/L — SIGNIFICANT CHANGE UP (ref 135–145)
SPECIMEN SOURCE: SIGNIFICANT CHANGE UP
WBC # BLD: 7.7 K/UL — SIGNIFICANT CHANGE UP (ref 3.8–10.5)
WBC # FLD AUTO: 7.7 K/UL — SIGNIFICANT CHANGE UP (ref 3.8–10.5)

## 2018-07-04 PROCEDURE — 99233 SBSQ HOSP IP/OBS HIGH 50: CPT

## 2018-07-04 RX ORDER — TRAMADOL HYDROCHLORIDE 50 MG/1
50 TABLET ORAL EVERY 4 HOURS
Refills: 0 | Status: DISCONTINUED | OUTPATIENT
Start: 2018-07-04 | End: 2018-07-05

## 2018-07-04 RX ORDER — SODIUM CHLORIDE 9 MG/ML
500 INJECTION INTRAMUSCULAR; INTRAVENOUS; SUBCUTANEOUS ONCE
Refills: 0 | Status: COMPLETED | OUTPATIENT
Start: 2018-07-04 | End: 2018-07-04

## 2018-07-04 RX ADMIN — Medication 100 MILLIGRAM(S): at 14:40

## 2018-07-04 RX ADMIN — Medication 325 MILLIGRAM(S): at 12:40

## 2018-07-04 RX ADMIN — SODIUM CHLORIDE 3 MILLILITER(S): 9 INJECTION INTRAMUSCULAR; INTRAVENOUS; SUBCUTANEOUS at 14:38

## 2018-07-04 RX ADMIN — Medication 100 MILLIGRAM(S): at 21:21

## 2018-07-04 RX ADMIN — LOSARTAN POTASSIUM 100 MILLIGRAM(S): 100 TABLET, FILM COATED ORAL at 05:30

## 2018-07-04 RX ADMIN — TRAMADOL HYDROCHLORIDE 50 MILLIGRAM(S): 50 TABLET ORAL at 21:21

## 2018-07-04 RX ADMIN — Medication 1 MILLIGRAM(S): at 12:40

## 2018-07-04 RX ADMIN — Medication 100 MILLIGRAM(S): at 05:30

## 2018-07-04 RX ADMIN — Medication 30 MILLIGRAM(S): at 23:45

## 2018-07-04 RX ADMIN — SODIUM CHLORIDE 3 MILLILITER(S): 9 INJECTION INTRAMUSCULAR; INTRAVENOUS; SUBCUTANEOUS at 06:09

## 2018-07-04 RX ADMIN — Medication 25 MILLIGRAM(S): at 05:30

## 2018-07-04 RX ADMIN — ENOXAPARIN SODIUM 40 MILLIGRAM(S): 100 INJECTION SUBCUTANEOUS at 12:40

## 2018-07-04 RX ADMIN — SODIUM CHLORIDE 3 MILLILITER(S): 9 INJECTION INTRAMUSCULAR; INTRAVENOUS; SUBCUTANEOUS at 21:19

## 2018-07-04 RX ADMIN — GABAPENTIN 100 MILLIGRAM(S): 400 CAPSULE ORAL at 21:21

## 2018-07-04 RX ADMIN — Medication 500 MILLIGRAM(S): at 05:30

## 2018-07-04 RX ADMIN — HYDROMORPHONE HYDROCHLORIDE 0.5 MILLIGRAM(S): 2 INJECTION INTRAMUSCULAR; INTRAVENOUS; SUBCUTANEOUS at 08:35

## 2018-07-04 RX ADMIN — AMLODIPINE BESYLATE 5 MILLIGRAM(S): 2.5 TABLET ORAL at 05:30

## 2018-07-04 RX ADMIN — Medication 325 MILLIGRAM(S): at 18:08

## 2018-07-04 RX ADMIN — TRAMADOL HYDROCHLORIDE 50 MILLIGRAM(S): 50 TABLET ORAL at 22:11

## 2018-07-04 RX ADMIN — GABAPENTIN 100 MILLIGRAM(S): 400 CAPSULE ORAL at 05:30

## 2018-07-04 RX ADMIN — HYDROMORPHONE HYDROCHLORIDE 0.5 MILLIGRAM(S): 2 INJECTION INTRAMUSCULAR; INTRAVENOUS; SUBCUTANEOUS at 09:10

## 2018-07-04 RX ADMIN — Medication 500 MILLIGRAM(S): at 18:08

## 2018-07-04 RX ADMIN — TRAMADOL HYDROCHLORIDE 50 MILLIGRAM(S): 50 TABLET ORAL at 04:10

## 2018-07-04 RX ADMIN — SODIUM CHLORIDE 1000 MILLILITER(S): 9 INJECTION INTRAMUSCULAR; INTRAVENOUS; SUBCUTANEOUS at 10:09

## 2018-07-04 RX ADMIN — TRAMADOL HYDROCHLORIDE 50 MILLIGRAM(S): 50 TABLET ORAL at 05:04

## 2018-07-04 RX ADMIN — GABAPENTIN 100 MILLIGRAM(S): 400 CAPSULE ORAL at 14:40

## 2018-07-04 RX ADMIN — Medication 325 MILLIGRAM(S): at 08:33

## 2018-07-04 NOTE — PROGRESS NOTE ADULT - SUBJECTIVE AND OBJECTIVE BOX
53yFemale    Diagnosis:  S/p R Revision Total Knee Replacement POD# 2    Patient was seen and evaluated at bedside. Patient with no acute complaints.   Pain is controlled. Pt ambulated with PT well yesterday. Pt had an episode of hypotension getting out of bed- bolus given, pt improved.   Denies CP/SOB, dyspnea, paresthesias, N/V/D, palpitations.     Vital Signs Last 24 Hrs  T(C): 37.2 (04 Jul 2018 06:12), Max: 37.2 (04 Jul 2018 06:12)  T(F): 98.9 (04 Jul 2018 06:12), Max: 98.9 (04 Jul 2018 06:12)  HR: 94 (04 Jul 2018 09:59) (56 - 94)  BP: 93/48 (04 Jul 2018 09:59) (93/48 - 109/55)  BP(mean): --  RR: 16 (04 Jul 2018 06:12) (16 - 17)  SpO2: 99% (04 Jul 2018 09:59) (99% - 100%)  I&O's Detail    04 Jul 2018 07:01  -  04 Jul 2018 11:34  --------------------------------------------------------  IN:    Sodium Chloride 0.9% IV Bolus: 500 mL  Total IN: 500 mL    OUT:  Total OUT: 0 mL    Total NET: 500 mL      Physical Exam:    General: AAOx3, NAD, resting comfortably in bed.    R KNEE:  Dressing is C/D/I. Dressing changed today, Staples intact. No erythema. SILT.  Wound with no drainage, healing well.   Lower extremity:  No calf tenderness, calves are soft. 2+pulses. NVI. (+) EHL/FHL/ADF/APF.  Good capillary refill.                           10.0   7.7   )-----------( 148      ( 04 Jul 2018 06:55 )             32.0     07-04    138  |  102  |  14  ----------------------------<  129<H>  3.5   |  29  |  0.82    Ca    8.7      04 Jul 2018 06:55        Impression:  53yFemale S/p R Revision Total Knee Replacement POD# 2  Plan:  -  Pain management  -  Dvt prophylaxis  -  Daily Physical Therapy:  WBAT  to RLE  -  Discharge planning: Home tomorrow  -  Heel bump to RLE  -  Incentive Spirometer  -  Monitor BP  -  Dressing changed today   -  Case d/w DR. Santoro 53yFemale    Diagnosis:  S/p R Revision Total Knee Replacement POD# 2    Patient was seen and evaluated at bedside. Patient with no acute complaints.   Pain is controlled. Pt ambulated with PT well yesterday. Pt had an episode of hypotension getting out of bed- bolus given, pt improved.   Denies CP/SOB, dyspnea, paresthesias, N/V/D, palpitations.     Vital Signs Last 24 Hrs  T(C): 37.2 (04 Jul 2018 06:12), Max: 37.2 (04 Jul 2018 06:12)  T(F): 98.9 (04 Jul 2018 06:12), Max: 98.9 (04 Jul 2018 06:12)  HR: 94 (04 Jul 2018 09:59) (56 - 94)  BP: 93/48 (04 Jul 2018 09:59) (93/48 - 109/55)  BP(mean): --  RR: 16 (04 Jul 2018 06:12) (16 - 17)  SpO2: 99% (04 Jul 2018 09:59) (99% - 100%)  I&O's Detail    04 Jul 2018 07:01  -  04 Jul 2018 11:34  --------------------------------------------------------  IN:    Sodium Chloride 0.9% IV Bolus: 500 mL  Total IN: 500 mL    OUT:  Total OUT: 0 mL    Total NET: 500 mL      Physical Exam:    General: AAOx3, NAD, resting comfortably in bed.    R KNEE:  Dressing is C/D/I. Dressing changed today, Staples intact. No erythema. SILT.  Wound with no drainage, healing well.   Lower extremity:  No calf tenderness, calves are soft. 2+pulses. NVI. (+) EHL/FHL/ADF/APF.  Good capillary refill.                           10.0   7.7   )-----------( 148      ( 04 Jul 2018 06:55 )             32.0     07-04    138  |  102  |  14  ----------------------------<  129<H>  3.5   |  29  |  0.82    Ca    8.7      04 Jul 2018 06:55        Impression:  53yFemale S/p R Revision Total Knee Replacement POD# 2  Plan:  -  Pain management  -  Dvt prophylaxis  -  Daily Physical Therapy:  WBAT  to RLE  -  Discharge planning: Home tomorrow  -  Heel bump to RLE  -  Incentive Spirometer  -  Monitor BP  -  Dressing changed today   -  Case d/w DR. Holbrook

## 2018-07-04 NOTE — PROGRESS NOTE ADULT - PROBLEM SELECTOR PLAN 1
53yFemale S/p R Revision Total Knee Replacement POD# 2  - Pain control  - DVT ppx  - Daily PT-WBAT to RLE  - Dressing changed today

## 2018-07-04 NOTE — PROGRESS NOTE ADULT - PROBLEM SELECTOR PLAN 1
- change tramadol to 50mg po q 4 hours prn  - toradol iv prn  - no oxycodone - + allergy to percocet  - gabapentin 100mg po tid  - stool softeners  - bp - sbp - 93 - 500 cc bolus x1

## 2018-07-04 NOTE — PROGRESS NOTE ADULT - SUBJECTIVE AND OBJECTIVE BOX
NP Note discussed with  Primary Attending    Patient is a 53y old  Female who presents with a chief complaint of Painful right knee, "specially when standing from a sitting position", unable to flex and extend the right lower extremity (02 Jul 2018 07:24).  Pt is s/p revision right TKR, pod#2.  Pt complaining of       INTERVAL HPI/OVERNIGHT EVENTS: no new complaints    MEDICATIONS  (STANDING):  acetaminophen  IVPB. 1000 milliGRAM(s) IV Intermittent once  amLODIPine   Tablet 5 milliGRAM(s) Oral daily  ascorbic acid 500 milliGRAM(s) Oral two times a day  docusate sodium 100 milliGRAM(s) Oral three times a day  enoxaparin Injectable 40 milliGRAM(s) SubCutaneous daily  ferrous    sulfate 325 milliGRAM(s) Oral three times a day with meals  folic acid 1 milliGRAM(s) Oral daily  gabapentin 100 milliGRAM(s) Oral three times a day  hydrochlorothiazide 25 milliGRAM(s) Oral daily  lactated ringers. 1000 milliLiter(s) (130 mL/Hr) IV Continuous <Continuous>  losartan 100 milliGRAM(s) Oral daily  sodium chloride 0.9% lock flush 3 milliLiter(s) IV Push every 8 hours  traMADol 50 milliGRAM(s) Oral every 8 hours    MEDICATIONS  (PRN):  HYDROmorphone  Injectable 0.5 milliGRAM(s) IV Push every 4 hours PRN Severe Pain (7 - 10)  ketorolac   Injectable 30 milliGRAM(s) IV Push every 6 hours PRN Mild Pain (1 - 3)  magnesium hydroxide Suspension 30 milliLiter(s) Oral daily PRN Constipation  ondansetron Injectable 4 milliGRAM(s) IV Push every 6 hours PRN Nausea and/or Vomiting  senna 2 Tablet(s) Oral at bedtime PRN Constipation      __________________________________________________  REVIEW OF SYSTEMS:    CONSTITUTIONAL: No fever,   EYES: no acute visual disturbances  NECK: No pain or stiffness  RESPIRATORY: No cough; No shortness of breath  CARDIOVASCULAR: No chest pain, no palpitations  GASTROINTESTINAL: No pain. No nausea or vomiting; No diarrhea   NEUROLOGICAL: No headache or numbness, no tremors  MUSCULOSKELETAL: No joint pain, no muscle pain  GENITOURINARY: no dysuria, no frequency, no hesitancy  PSYCHIATRY: no depression , no anxiety  ALL OTHER  ROS negative        Vital Signs Last 24 Hrs  T(C): 37.2 (04 Jul 2018 06:12), Max: 37.2 (04 Jul 2018 06:12)  T(F): 98.9 (04 Jul 2018 06:12), Max: 98.9 (04 Jul 2018 06:12)  HR: 56 (04 Jul 2018 06:12) (56 - 73)  BP: 109/55 (04 Jul 2018 06:12) (102/56 - 120/64)  BP(mean): --  RR: 16 (04 Jul 2018 06:12) (16 - 17)  SpO2: 100% (04 Jul 2018 06:12) (100% - 100%)    ________________________________________________  PHYSICAL EXAM:  GENERAL: NAD  HEENT: Normocephalic;  conjunctivae and sclerae clear; moist mucous membranes;   NECK : supple  CHEST/LUNG: Clear to auscultation bilaterally with good air entry   HEART: S1 S2  regular; no murmurs, gallops or rubs  ABDOMEN: Soft, Nontender, Nondistended; Bowel sounds present  EXTREMITIES: no cyanosis; no edema; no calf tenderness  SKIN: warm and dry; no rash  NERVOUS SYSTEM:  Awake and alert; Oriented  to place, person and time ; no new deficits    _________________________________________________  LABS:                        10.0   7.7   )-----------( 148      ( 04 Jul 2018 06:55 )             32.0     07-04    138  |  102  |  14  ----------------------------<  129<H>  3.5   |  29  |  0.82    Ca    8.7      04 Jul 2018 06:55          CAPILLARY BLOOD GLUCOSE            RADIOLOGY & ADDITIONAL TESTS:    Imaging Personally Reviewed:  YES/NO    Consultant(s) Notes Reviewed:   YES/ No    Care Discussed with Consultants :     Plan of care was discussed with patient and /or primary care giver; all questions and concerns were addressed and care was aligned with patient's wishes. NP Note discussed with  Primary Attending    Patient is a 53y old  Female who presents with a chief complaint of Painful right knee, "specially when standing from a sitting position", unable to flex and extend the right lower extremity (02 Jul 2018 07:24).  Pt is s/p revision right TKR, pod#2.  Pt complaining of right knee pain, which has increased in severity since yesterday.  No nausea or vomiting.  No chest pain or sob.       INTERVAL HPI/OVERNIGHT EVENTS: no new complaints    MEDICATIONS  (STANDING):  acetaminophen  IVPB. 1000 milliGRAM(s) IV Intermittent once  amLODIPine   Tablet 5 milliGRAM(s) Oral daily  ascorbic acid 500 milliGRAM(s) Oral two times a day  docusate sodium 100 milliGRAM(s) Oral three times a day  enoxaparin Injectable 40 milliGRAM(s) SubCutaneous daily  ferrous    sulfate 325 milliGRAM(s) Oral three times a day with meals  folic acid 1 milliGRAM(s) Oral daily  gabapentin 100 milliGRAM(s) Oral three times a day  hydrochlorothiazide 25 milliGRAM(s) Oral daily  lactated ringers. 1000 milliLiter(s) (130 mL/Hr) IV Continuous <Continuous>  losartan 100 milliGRAM(s) Oral daily  sodium chloride 0.9% lock flush 3 milliLiter(s) IV Push every 8 hours  traMADol 50 milliGRAM(s) Oral every 8 hours    MEDICATIONS  (PRN):  HYDROmorphone  Injectable 0.5 milliGRAM(s) IV Push every 4 hours PRN Severe Pain (7 - 10)  ketorolac   Injectable 30 milliGRAM(s) IV Push every 6 hours PRN Mild Pain (1 - 3)  magnesium hydroxide Suspension 30 milliLiter(s) Oral daily PRN Constipation  ondansetron Injectable 4 milliGRAM(s) IV Push every 6 hours PRN Nausea and/or Vomiting  senna 2 Tablet(s) Oral at bedtime PRN Constipation      __________________________________________________  REVIEW OF SYSTEMS:    CONSTITUTIONAL: No fever,   EYES: no acute visual disturbances  NECK: No pain or stiffness  RESPIRATORY: No cough; No shortness of breath  CARDIOVASCULAR: No chest pain, no palpitations  GASTROINTESTINAL: No pain. No nausea or vomiting; No diarrhea   NEUROLOGICAL: No headache or numbness, no tremors, + dizziness  MUSCULOSKELETAL: + right knee pain  GENITOURINARY: no dysuria, no frequency, no hesitancy  PSYCHIATRY: no depression , no anxiety  ALL OTHER  ROS negative        Vital Signs Last 24 Hrs  T(C): 37.2 (04 Jul 2018 06:12), Max: 37.2 (04 Jul 2018 06:12)  T(F): 98.9 (04 Jul 2018 06:12), Max: 98.9 (04 Jul 2018 06:12)  HR: 56 (04 Jul 2018 06:12) (56 - 73)  BP: 109/55 (04 Jul 2018 06:12) (102/56 - 120/64)  BP(mean): --  RR: 16 (04 Jul 2018 06:12) (16 - 17)  SpO2: 100% (04 Jul 2018 06:12) (100% - 100%)    ________________________________________________  PHYSICAL EXAM:  GENERAL: NAD  HEENT: Normocephalic;  conjunctivae and sclerae clear; moist mucous membranes;   NECK : supple  CHEST/LUNG: Clear to auscultation bilaterally with good air entry   HEART: S1 S2  regular; no murmurs, gallops or rubs  ABDOMEN: Soft, Nontender, Nondistended; Bowel sounds present  EXTREMITIES: no cyanosis; no edema; no calf tenderness  SKIN: warm and dry; no rash  NERVOUS SYSTEM:  Awake and alert; Oriented  to place, person and time ; no new deficits  MUSCULOSKELETAL: + right knee tenderness, decreased rom    _________________________________________________  LABS:                        10.0   7.7   )-----------( 148      ( 04 Jul 2018 06:55 )             32.0     07-04    138  |  102  |  14  ----------------------------<  129<H>  3.5   |  29  |  0.82    Ca    8.7      04 Jul 2018 06:55          CAPILLARY BLOOD GLUCOSE            RADIOLOGY & ADDITIONAL TESTS:    Imaging Personally Reviewed:  YES/NO    Consultant(s) Notes Reviewed:   YES/ No    Care Discussed with Consultants :     Plan of care was discussed with patient and /or primary care giver; all questions and concerns were addressed and care was aligned with patient's wishes.

## 2018-07-04 NOTE — PROGRESS NOTE ADULT - SUBJECTIVE AND OBJECTIVE BOX
pt seen in icu [  ], reg med floor [ x  ], bed [x  ], chair at bedside [   ]  Awake ,alert, comfortable in bed in NAD. Feeling better . Still with edema on right knee. S/P revision of her right TKR. Pt has no new complaints.    REVIEW OF SYSTEMS:    CONSTITUTIONAL: No weakness, fevers or chills  EYES/ENT: No visual changes;  No vertigo or throat pain   NECK: No pain or stiffness  RESPIRATORY: No cough, wheezing, hemoptysis; No shortness of breath  CARDIOVASCULAR: No chest pain or palpitations  GASTROINTESTINAL: No abdominal or epigastric pain. No nausea, vomiting, or hematemesis; No diarrhea or constipation. No melena or hematochezia.  GENITOURINARY: No dysuria, frequency or hematuria  NEUROLOGICAL: No numbness or weakness  SKIN: No itching, burning, rashes, or lesions   All other review of systems is negative unless indicated above.    Physical Exam    General: WN/WD NAD  Neurology: A&Ox3, nonfocal, GAXIOLA x 4  Respiratory: CTA B/L  CV: RRR, S1S2, no murmurs, rubs or gallops  Abdominal: Soft, NT, ND +BS, Last BM  Extremities:+ edema right knee , +pain right knee , dressing clean.      Allergies  Percocet 5/325 (Hives; Rash)  strawberry (Rash; Hives)      Health Issues  Stiffness of right knee, not elsewhere classified  Stiffness of right knee, not elsewhere classified  Presence of right artificial knee joint  Stiffness of knee joint, right  Hypertension  Primary osteoarthritis of right knee  Menorrhagia with regular cycle  Morbid obesity  S/P TKR (total knee replacement), right  S/P arthroscopy of right knee  S/P  section  S/P hysterectomy      Vitals  T(F): 98.9 (18 @ 06:12), Max: 98.9 (18 @ 06:12)  HR: 94 (18 @ 09:59) (56 - 94)  BP: 93/48 (18 @ 09:59) (93/48 - 109/55)  RR: 16 (18 @ 06:12) (16 - 17)  SpO2: 99% (18 @ 09:59) (99% - 100%)  Wt(kg): --  CAPILLARY BLOOD GLUCOSE          Labs                          10.0   7.7   )-----------( 148      ( 2018 06:55 )             32.0       07-04    138  |  102  |  14  ----------------------------<  129<H>  3.5   |  29  |  0.82    Ca    8.7      2018 06:55              Radiology Results  < from: Xray Knee 3 Views, Right (18 @ 15:06) >  FINDINGS:  Total knee replacement noted in gross anatomic alignment with post   operative changes.     IMPRESSION:  Post operative changes.     < end of copied text >      Meds    MEDICATIONS  (STANDING):  acetaminophen  IVPB. 1000 milliGRAM(s) IV Intermittent once  amLODIPine   Tablet 5 milliGRAM(s) Oral daily  ascorbic acid 500 milliGRAM(s) Oral two times a day  docusate sodium 100 milliGRAM(s) Oral three times a day  enoxaparin Injectable 40 milliGRAM(s) SubCutaneous daily  ferrous    sulfate 325 milliGRAM(s) Oral three times a day with meals  folic acid 1 milliGRAM(s) Oral daily  gabapentin 100 milliGRAM(s) Oral three times a day  hydrochlorothiazide 25 milliGRAM(s) Oral daily  lactated ringers. 1000 milliLiter(s) (130 mL/Hr) IV Continuous <Continuous>  losartan 100 milliGRAM(s) Oral daily  sodium chloride 0.9% lock flush 3 milliLiter(s) IV Push every 8 hours      MEDICATIONS  (PRN):  ketorolac   Injectable 30 milliGRAM(s) IV Push every 6 hours PRN Mild Pain (1 - 3)  magnesium hydroxide Suspension 30 milliLiter(s) Oral daily PRN Constipation  ondansetron Injectable 4 milliGRAM(s) IV Push every 6 hours PRN Nausea and/or Vomiting  senna 2 Tablet(s) Oral at bedtime PRN Constipation  traMADol 50 milliGRAM(s) Oral every 4 hours PRN Severe Pain (7 - 10)

## 2018-07-05 ENCOUNTER — TRANSCRIPTION ENCOUNTER (OUTPATIENT)
Age: 53
End: 2018-07-05

## 2018-07-05 VITALS
DIASTOLIC BLOOD PRESSURE: 44 MMHG | HEART RATE: 66 BPM | RESPIRATION RATE: 16 BRPM | OXYGEN SATURATION: 100 % | TEMPERATURE: 98 F | SYSTOLIC BLOOD PRESSURE: 108 MMHG

## 2018-07-05 LAB
ANION GAP SERPL CALC-SCNC: 7 MMOL/L — SIGNIFICANT CHANGE UP (ref 5–17)
BUN SERPL-MCNC: 8 MG/DL — SIGNIFICANT CHANGE UP (ref 7–18)
CALCIUM SERPL-MCNC: 8 MG/DL — LOW (ref 8.4–10.5)
CHLORIDE SERPL-SCNC: 100 MMOL/L — SIGNIFICANT CHANGE UP (ref 96–108)
CO2 SERPL-SCNC: 31 MMOL/L — SIGNIFICANT CHANGE UP (ref 22–31)
CREAT SERPL-MCNC: 0.78 MG/DL — SIGNIFICANT CHANGE UP (ref 0.5–1.3)
GLUCOSE SERPL-MCNC: 126 MG/DL — HIGH (ref 70–99)
HCT VFR BLD CALC: 30.5 % — LOW (ref 34.5–45)
HGB BLD-MCNC: 9.4 G/DL — LOW (ref 11.5–15.5)
MCHC RBC-ENTMCNC: 25.6 PG — LOW (ref 27–34)
MCHC RBC-ENTMCNC: 30.9 GM/DL — LOW (ref 32–36)
MCV RBC AUTO: 82.9 FL — SIGNIFICANT CHANGE UP (ref 80–100)
PLATELET # BLD AUTO: 138 K/UL — LOW (ref 150–400)
POTASSIUM SERPL-MCNC: 3.6 MMOL/L — SIGNIFICANT CHANGE UP (ref 3.5–5.3)
POTASSIUM SERPL-SCNC: 3.6 MMOL/L — SIGNIFICANT CHANGE UP (ref 3.5–5.3)
RBC # BLD: 3.67 M/UL — LOW (ref 3.8–5.2)
RBC # FLD: 13.5 % — SIGNIFICANT CHANGE UP (ref 10.3–14.5)
SODIUM SERPL-SCNC: 138 MMOL/L — SIGNIFICANT CHANGE UP (ref 135–145)
SURGICAL PATHOLOGY FINAL REPORT - CH: SIGNIFICANT CHANGE UP
WBC # BLD: 5.5 K/UL — SIGNIFICANT CHANGE UP (ref 3.8–10.5)
WBC # FLD AUTO: 5.5 K/UL — SIGNIFICANT CHANGE UP (ref 3.8–10.5)

## 2018-07-05 PROCEDURE — 99233 SBSQ HOSP IP/OBS HIGH 50: CPT

## 2018-07-05 RX ORDER — FERROUS SULFATE 325(65) MG
1 TABLET ORAL
Qty: 30 | Refills: 0
Start: 2018-07-05

## 2018-07-05 RX ORDER — ENOXAPARIN SODIUM 100 MG/ML
40 INJECTION SUBCUTANEOUS
Qty: 13 | Refills: 0
Start: 2018-07-05 | End: 2018-07-17

## 2018-07-05 RX ORDER — DOCUSATE SODIUM 100 MG
1 CAPSULE ORAL
Qty: 30 | Refills: 0
Start: 2018-07-05

## 2018-07-05 RX ORDER — TRAMADOL HYDROCHLORIDE 50 MG/1
1 TABLET ORAL
Qty: 40 | Refills: 0
Start: 2018-07-05

## 2018-07-05 RX ORDER — GABAPENTIN 400 MG/1
1 CAPSULE ORAL
Qty: 21 | Refills: 0
Start: 2018-07-05 | End: 2018-07-11

## 2018-07-05 RX ORDER — CELECOXIB 200 MG/1
1 CAPSULE ORAL
Qty: 7 | Refills: 0
Start: 2018-07-05 | End: 2018-07-11

## 2018-07-05 RX ADMIN — Medication 100 MILLIGRAM(S): at 05:47

## 2018-07-05 RX ADMIN — LOSARTAN POTASSIUM 100 MILLIGRAM(S): 100 TABLET, FILM COATED ORAL at 05:47

## 2018-07-05 RX ADMIN — GABAPENTIN 100 MILLIGRAM(S): 400 CAPSULE ORAL at 05:47

## 2018-07-05 RX ADMIN — Medication 1 MILLIGRAM(S): at 13:22

## 2018-07-05 RX ADMIN — GABAPENTIN 100 MILLIGRAM(S): 400 CAPSULE ORAL at 13:22

## 2018-07-05 RX ADMIN — AMLODIPINE BESYLATE 5 MILLIGRAM(S): 2.5 TABLET ORAL at 05:47

## 2018-07-05 RX ADMIN — Medication 30 MILLIGRAM(S): at 13:25

## 2018-07-05 RX ADMIN — Medication 325 MILLIGRAM(S): at 13:23

## 2018-07-05 RX ADMIN — SODIUM CHLORIDE 3 MILLILITER(S): 9 INJECTION INTRAMUSCULAR; INTRAVENOUS; SUBCUTANEOUS at 13:23

## 2018-07-05 RX ADMIN — Medication 30 MILLIGRAM(S): at 13:40

## 2018-07-05 RX ADMIN — TRAMADOL HYDROCHLORIDE 50 MILLIGRAM(S): 50 TABLET ORAL at 05:49

## 2018-07-05 RX ADMIN — Medication 30 MILLIGRAM(S): at 00:05

## 2018-07-05 RX ADMIN — Medication 500 MILLIGRAM(S): at 05:47

## 2018-07-05 RX ADMIN — ENOXAPARIN SODIUM 40 MILLIGRAM(S): 100 INJECTION SUBCUTANEOUS at 13:22

## 2018-07-05 RX ADMIN — TRAMADOL HYDROCHLORIDE 50 MILLIGRAM(S): 50 TABLET ORAL at 06:43

## 2018-07-05 RX ADMIN — SODIUM CHLORIDE 3 MILLILITER(S): 9 INJECTION INTRAMUSCULAR; INTRAVENOUS; SUBCUTANEOUS at 05:47

## 2018-07-05 RX ADMIN — Medication 100 MILLIGRAM(S): at 13:23

## 2018-07-05 RX ADMIN — Medication 25 MILLIGRAM(S): at 05:47

## 2018-07-05 RX ADMIN — Medication 325 MILLIGRAM(S): at 08:41

## 2018-07-05 NOTE — DISCHARGE NOTE ADULT - PATIENT PORTAL LINK FT
You can access the [facility] Patient Portal, offered by [facility], by registering with the following website: [email or web address]

## 2018-07-05 NOTE — CHART NOTE - NSCHARTNOTEFT_GEN_A_CORE
Ortho addendum:                          9.4    5.5   )-----------( 138      ( 05 Jul 2018 07:34 )             30.5   07-05    138  |  100  |  8   ----------------------------<  126<H>  3.6   |  31  |  0.78    Ca    8.0<L>      05 Jul 2018 07:34    Labs were reviewed and discussed with Dr. Holbrook

## 2018-07-05 NOTE — PROGRESS NOTE ADULT - PROVIDER SPECIALTY LIST ADULT
Internal Medicine
Internal Medicine
Orthopedics
Orthopedics
Pain Medicine
Orthopedics
Pain Medicine
Internal Medicine

## 2018-07-05 NOTE — PROGRESS NOTE ADULT - SUBJECTIVE AND OBJECTIVE BOX
53yFemale    Diagnosis:   S/p R Revision Total Knee Replacement POD# 3    Patient was seen and evaluated at bedside. Patient with no acute complaints.   Pain is well controlled. Pt notes overall improvement in her condition today. 	  Denies CP/SOB, dyspnea, paresthesias, N/V/D, palpitations.     Vital Signs Last 24 Hrs  T(C): 36.7 (05 Jul 2018 06:51), Max: 37.6 (04 Jul 2018 21:48)  T(F): 98.1 (05 Jul 2018 06:51), Max: 99.6 (04 Jul 2018 21:48)  HR: 52 (05 Jul 2018 06:51) (52 - 94)  BP: 96/58 (05 Jul 2018 06:51) (93/48 - 118/56)  BP(mean): --  RR: 18 (05 Jul 2018 06:51) (16 - 18)  SpO2: 97% (05 Jul 2018 06:51) (97% - 100%)  I&O's Detail    04 Jul 2018 07:01  -  05 Jul 2018 07:00  --------------------------------------------------------  IN:    Sodium Chloride 0.9% IV Bolus: 500 mL  Total IN: 500 mL    OUT:  Total OUT: 0 mL    Total NET: 500 mL    Physical Exam:    General: AAOx3, NAD, resting comfortably in bed.    R KNEE:  Dressing is C/D/I. Skin is pink and warm. No erythema. SILT.    Lower extremity:  No calf tenderness, calves are soft. 2+pulses. NVI. (+) EHL/FHL/ADF/APF.  Good capillary refill. SILT                          10.0   7.7   )-----------( 148      ( 04 Jul 2018 06:55 )             32.0     07-04    138  |  102  |  14  ----------------------------<  129<H>  3.5   |  29  |  0.82    Ca    8.7      04 Jul 2018 06:55        Impression:  53yFemale S/p R Revision Total Knee Replacement POD# 3  Plan:  -  Pain management  -  Dvt prophylaxis  -  Daily Physical Therapy:  WBAT  to RLE  -  Discharge planning: Home today  -  Heel bump to RLE  -  Incentive spirometer  -  Case d/w DR. Holbrook

## 2018-07-05 NOTE — DISCHARGE NOTE ADULT - CARE PROVIDER_API CALL
Vinicio Holbrook (DO), Orthopaedic Surgery  [address] [place] [address]  Phone: [phone]  Fax: [phone]

## 2018-07-05 NOTE — DISCHARGE NOTE ADULT - MEDICATION SUMMARY - MEDICATIONS TO TAKE
I will START or STAY ON the medications listed below when I get home from the hospital:    traMADol 50 mg oral tablet  -- 1 tab(s) by mouth every 6 hours, As Needed -Severe Pain (7 - 10) Supervising MD: Dr. Leonidas Santoro. [facility]#[id number] NPI [id number] MDD:4  -- Indication: For Pain    CeleBREX 200 mg oral capsule  -- 1 cap(s) by mouth once a day MDD:1  -- Do not take this drug if you are pregnant.  Medication should be taken with plenty of water.  Obtain medical advice before taking any non-prescription drugs as some may affect the action of this medication.  Take with food or milk.    -- Indication: For Pain    enoxaparin 40 mg/0.4 mL injectable solution  -- 40 milligram(s) subcutaneously once a day MDD:1  -- It is very important that you take or use this exactly as directed.  Do not skip doses or discontinue unless directed by your doctor.    -- Indication: For dvt ppx    gabapentin 100 mg oral capsule  -- 1 cap(s) by mouth 3 times a day x 7 days MDD:3   -- Indication: For Pain    losartan-hydroCHLOROthiazide 100 mg-25 mg oral tablet  -- 1 tab(s) by mouth once a day  -- Indication: For Htn    amLODIPine 5 mg oral tablet  -- 1 tab(s) by mouth once a day  -- Indication: For Htn    ferrous sulfate 325 mg (65 mg elemental iron) oral tablet  -- 1 tab(s) by mouth 2 times a day MDD:2  -- Check with your doctor before becoming pregnant.  Do not chew, break, or crush.  May discolor urine or feces.    -- Indication: For Anemia    Colace 100 mg oral capsule  -- 1 cap(s) by mouth 3 times a day, As Needed -for constipation MDD:3   -- Medication should be taken with plenty of water.    -- Indication: For constipation

## 2018-07-05 NOTE — DISCHARGE NOTE ADULT - ADDITIONAL INSTRUCTIONS
Pain Management- See Attached Medication Reconciliation    **StretchStrap Stretching exercises for Total knee replacement***  Weight Bearing Status: _WBAT of the RLE  Equipment needs: Commode, Walker  Dressing: Please keep bandage/dressing Clean, Dry, and Intact.  Incision Site: REMOVE STAPLES on 7/18/2018  STAPLES AND DRESSING TO BE REMOVED BY NURSE  NURSE TO APPLY STERI-STRIPS TO THE WOUND AFTER STAPLE REMOVAL   Dvt prophylaxis: D/C LOVENOX on 7/18/2018  PT/Occupational Therapy are Activities of Daily Living as appropriate  Follow up with Orthopedic Surgeon after STAPLES ARE REMOVED at:[phone] DR SEGURA

## 2018-07-05 NOTE — PROGRESS NOTE ADULT - PROBLEM SELECTOR PLAN 1
53yFemale S/p R Revision Total Knee Replacement POD# 3  - Pain control  - Daily PT-WBAT to RLE  - DVT ppx

## 2018-07-05 NOTE — DISCHARGE NOTE ADULT - CARE PLAN
Principal Discharge DX:	Primary osteoarthritis of right knee  Goal:	improve rom  Assessment and plan of treatment:	improve pain  Secondary Diagnosis:	Morbid obesity  Secondary Diagnosis:	S/P hysterectomy  Secondary Diagnosis:	S/P  section  Secondary Diagnosis:	S/P arthroscopy of right knee  Secondary Diagnosis:	Menorrhagia with regular cycle

## 2018-07-05 NOTE — PROGRESS NOTE ADULT - ASSESSMENT
53yFemale S/p R Revision Total Knee Replacement POD# 1
53yFemalkris S/p R Revision Total Knee Replacement POD# 2
53yFemalkris S/p R Revision Total Knee Replacement POD# 3

## 2018-07-05 NOTE — PROGRESS NOTE ADULT - SUBJECTIVE AND OBJECTIVE BOX
pt seen in icu [  ], reg med floor [  x ], bed [ x ], chair at bedside [   ]    Awake ,alert, comfortable in bed in NAD. Still with edema on right knee. S/P revision of her right TKR. Pt has no new complaints.    REVIEW OF SYSTEMS:    CONSTITUTIONAL: No weakness, fevers or chills  EYES/ENT: No visual changes;  No vertigo or throat pain   NECK: No pain or stiffness  RESPIRATORY: No cough, wheezing, hemoptysis; No shortness of breath  CARDIOVASCULAR: No chest pain or palpitations  GASTROINTESTINAL: No abdominal or epigastric pain. No nausea, vomiting, or hematemesis; No diarrhea or constipation. No melena or hematochezia.  GENITOURINARY: No dysuria, frequency or hematuria  NEUROLOGICAL: No numbness or weakness  SKIN: No itching, burning, rashes, or lesions   All other review of systems is negative unless indicated above.    Physical Exam    General: WN/WD NAD  Neurology: A&Ox3, nonfocal, GAXIOLA x 4  Respiratory: CTA B/L  CV: RRR, S1S2, no murmurs, rubs or gallops  Abdominal: Soft, NT, ND +BS, Last BM  Extremities: + edema , + incisional tenderness     Allergies  Percocet 5/325 (Hives; Rash)  strawberry (Rash; Hives)      Health Issues  Stiffness of right knee, not elsewhere classified  Stiffness of right knee, not elsewhere classified  Presence of right artificial knee joint  Stiffness of knee joint, right  Hypertension  Primary osteoarthritis of right knee  Menorrhagia with regular cycle  Morbid obesity  S/P TKR (total knee replacement), right  S/P arthroscopy of right knee  S/P  section  S/P hysterectomy      Vitals  T(F): 98.1 (18 @ 06:51), Max: 99.6 (18 @ 21:48)  HR: 52 (18 @ 06:51) (52 - 67)  BP: 96/58 (18 @ 06:51) (96/58 - 115/61)  RR: 18 (18 @ 06:51) (16 - 18)  SpO2: 97% (18 @ 06:51) (97% - 100%)  Wt(kg): --  CAPILLARY BLOOD GLUCOSE          Labs                          9.4    5.5   )-----------( 138      ( 2018 07:34 )             30.5       07-    138  |  100  |  8   ----------------------------<  126<H>  3.6   |  31  |  0.78    Ca    8.0<L>      2018 07:34              Radiology Results  < from: Xray Knee 3 Views, Right (18 @ 15:06) >  FINDINGS:  Total knee replacement noted in gross anatomic alignment with post   operative changes.     IMPRESSION:  Post operative changes.     < end of copied text >      Meds    MEDICATIONS  (STANDING):  acetaminophen  IVPB. 1000 milliGRAM(s) IV Intermittent once  amLODIPine   Tablet 5 milliGRAM(s) Oral daily  ascorbic acid 500 milliGRAM(s) Oral two times a day  docusate sodium 100 milliGRAM(s) Oral three times a day  enoxaparin Injectable 40 milliGRAM(s) SubCutaneous daily  ferrous    sulfate 325 milliGRAM(s) Oral three times a day with meals  folic acid 1 milliGRAM(s) Oral daily  gabapentin 100 milliGRAM(s) Oral three times a day  hydrochlorothiazide 25 milliGRAM(s) Oral daily  lactated ringers. 1000 milliLiter(s) (130 mL/Hr) IV Continuous <Continuous>  losartan 100 milliGRAM(s) Oral daily  sodium chloride 0.9% lock flush 3 milliLiter(s) IV Push every 8 hours      MEDICATIONS  (PRN):  ketorolac   Injectable 30 milliGRAM(s) IV Push every 6 hours PRN Mild Pain (1 - 3)  magnesium hydroxide Suspension 30 milliLiter(s) Oral daily PRN Constipation  ondansetron Injectable 4 milliGRAM(s) IV Push every 6 hours PRN Nausea and/or Vomiting  senna 2 Tablet(s) Oral at bedtime PRN Constipation  traMADol 50 milliGRAM(s) Oral every 4 hours PRN Severe Pain (7 - 10) pt seen in icu [  ], reg med floor [  x ], bed [ x ], chair at bedside [   ]  Awake ,alert, comfortable in bed in NAD. Still with edema on right knee. S/P revision of her right TKR. Pt has no new complaints.    REVIEW OF SYSTEMS:    CONSTITUTIONAL: No weakness, fevers or chills  EYES/ENT: No visual changes;  No vertigo or throat pain   NECK: No pain or stiffness  RESPIRATORY: No cough, wheezing, hemoptysis; No shortness of breath  CARDIOVASCULAR: No chest pain or palpitations  GASTROINTESTINAL: No abdominal or epigastric pain. No nausea, vomiting, or hematemesis; No diarrhea or constipation. No melena or hematochezia.  GENITOURINARY: No dysuria, frequency or hematuria  NEUROLOGICAL: No numbness or weakness  SKIN: No itching, burning, rashes, or lesions   All other review of systems is negative unless indicated above.    Physical Exam    General: WN/WD NAD  Neurology: A&Ox3, nonfocal, GAXIOLA x 4  Respiratory: CTA B/L  CV: RRR, S1S2, no murmurs, rubs or gallops  Abdominal: Soft, NT, ND +BS, Last BM  Extremities: + edema , + incisional tenderness     Allergies  Percocet 5/325 (Hives; Rash)  strawberry (Rash; Hives)      Health Issues  Stiffness of right knee, not elsewhere classified  Stiffness of right knee, not elsewhere classified  Presence of right artificial knee joint  Stiffness of knee joint, right  Hypertension  Primary osteoarthritis of right knee  Menorrhagia with regular cycle  Morbid obesity  S/P TKR (total knee replacement), right  S/P arthroscopy of right knee  S/P  section  S/P hysterectomy      Vitals  T(F): 98.1 (18 @ 06:51), Max: 99.6 (18 @ 21:48)  HR: 52 (18 @ 06:51) (52 - 67)  BP: 96/58 (18 @ 06:51) (96/58 - 115/61)  RR: 18 (18 @ 06:51) (16 - 18)  SpO2: 97% (18 @ 06:51) (97% - 100%)  Wt(kg): --  CAPILLARY BLOOD GLUCOSE          Labs                          9.4    5.5   )-----------( 138      ( 2018 07:34 )             30.5       07-    138  |  100  |  8   ----------------------------<  126<H>  3.6   |  31  |  0.78    Ca    8.0<L>      2018 07:34              Radiology Results  < from: Xray Knee 3 Views, Right (18 @ 15:06) >  FINDINGS:  Total knee replacement noted in gross anatomic alignment with post   operative changes.     IMPRESSION:  Post operative changes.     < end of copied text >      Meds    MEDICATIONS  (STANDING):  acetaminophen  IVPB. 1000 milliGRAM(s) IV Intermittent once  amLODIPine   Tablet 5 milliGRAM(s) Oral daily  ascorbic acid 500 milliGRAM(s) Oral two times a day  docusate sodium 100 milliGRAM(s) Oral three times a day  enoxaparin Injectable 40 milliGRAM(s) SubCutaneous daily  ferrous    sulfate 325 milliGRAM(s) Oral three times a day with meals  folic acid 1 milliGRAM(s) Oral daily  gabapentin 100 milliGRAM(s) Oral three times a day  hydrochlorothiazide 25 milliGRAM(s) Oral daily  lactated ringers. 1000 milliLiter(s) (130 mL/Hr) IV Continuous <Continuous>  losartan 100 milliGRAM(s) Oral daily  sodium chloride 0.9% lock flush 3 milliLiter(s) IV Push every 8 hours      MEDICATIONS  (PRN):  ketorolac   Injectable 30 milliGRAM(s) IV Push every 6 hours PRN Mild Pain (1 - 3)  magnesium hydroxide Suspension 30 milliLiter(s) Oral daily PRN Constipation  ondansetron Injectable 4 milliGRAM(s) IV Push every 6 hours PRN Nausea and/or Vomiting  senna 2 Tablet(s) Oral at bedtime PRN Constipation  traMADol 50 milliGRAM(s) Oral every 4 hours PRN Severe Pain (7 - 10)

## 2018-07-05 NOTE — DISCHARGE NOTE ADULT - SECONDARY DIAGNOSIS.
Morbid obesity S/P hysterectomy S/P  section S/P arthroscopy of right knee Menorrhagia with regular cycle

## 2018-07-07 LAB
CULTURE RESULTS: SIGNIFICANT CHANGE UP
SPECIMEN SOURCE: SIGNIFICANT CHANGE UP

## 2019-01-15 NOTE — PROGRESS NOTE ADULT - PROBLEM SELECTOR PROBLEM 1
Physician Documentation                                                                           

 [facility]                                                                

Name: Kerri Parra                                                                                

Age: 63 yrs                                                                                       

Sex: Female                                                                                       

: 1955                                                                                   

MRN: [id number]                                                                                   

Arrival Date: 01/15/2019                                                                          

Time: 13:44                                                                                       

Account#: [id number]                                                                            

Bed 8                                                                                             

Private MD: DANA COLES                                                                          

ED Physician Gregory Johnson                                                                      

HPI:                                                                                              

01/15                                                                                             

16:28 This 63 yrs old  Female presents to ER via Wheelchair with complaints of       ps1 

      Fever, Weakness, Decreased Appetite.                                                        

16:28 patient has a history of TBI and residual weakness on right side. She has had increased ps1 

      somnolence and has changed from baseline over the last 3 days. Daughters are concerned      

      for UTI. Patient usually functions with assistance. Able to ambulate with assistance.       

      Unable to get home health assistance. .                                                     

                                                                                                  

Historical:                                                                                       

- Allergies:                                                                                      

13:59 No Known Allergies;                                                                     sg  

- PMHx:                                                                                           

13:48 Brain bleed; breast cancer; Hypothyroidism; traumatic brain injury;                     sg  

- PSHx:                                                                                           

13:48 Mastectomy;                                                                             sg  

                                                                                                  

- Immunization history:: Adult Immunizations not up to date.                                      

- Social history:: Smoking status: Patient/guardian denies using tobacco.                         

- Ebola Screening: : Patient negative for fever greater than or equal to 101.5 degrees            

  Fahrenheit, and additional compatible Ebola Virus Disease symptoms Patient denies               

  exposure to infectious person Patient denies travel to an Ebola-affected area in the            

  21 days before illness onset No symptoms or risks identified at this time.                      

                                                                                                  

                                                                                                  

ROS:                                                                                              

16:28 Constitutional: Negative for fever, chills, and weight loss, Eyes: Negative for injury, ps1 

      pain, redness, and discharge, Cardiovascular: Negative for chest pain, palpitations,        

      and edema, Respiratory: Negative for shortness of breath, cough, wheezing, and              

      pleuritic chest pain, Abdomen/GI: Negative for abdominal pain, nausea, vomiting,            

      diarrhea, and constipation.                                                                 

16:28 Skin: Negative for injury, rash, and discoloration.                                         

16:28 MS/extremity: Positive for weakness, established form TBI.                                  

16:28 Neuro: Positive for somnolence.                                                             

                                                                                                  

Exam:                                                                                             

16:28 Constitutional:  This is a well developed, well nourished patient who is awake, alert,  ps1 

      and in no acute distress. Head/Face:  Normocephalic, atraumatic. Chest/axilla:  Normal      

      chest wall appearance and motion.  Nontender with no deformity.  No lesions are             

      appreciated. Cardiovascular:  Regular rate and rhythm.  No gallops, murmurs, or rubs.       

      Normal PMI, no JVD.  No pulse deficits. Respiratory:  Lungs have equal breath sounds        

      bilaterally, clear to auscultation and percussion.  No rales, rhonchi or wheezes noted.     

       No increased work of breathing, no retractions or nasal flaring. Abdomen/GI:  Soft,        

      non-tender, with normal bowel sounds.  No distension or tympany.  No guarding or            

      rebound.  No evidence of tenderness throughout. Neuro:  Awake and alert, GCS 15,            

      oriented to person, place, time, and situation.  Cranial nerves II-XII grossly intact.      

      Sensory grossly intact. Psych:  Awake, alert, with orientation to person, place and         

      time.  Behavior, mood, and affect are within normal limits.                                 

16:28 Neuro: Orientation: is normal, Mentation: lucid.                                            

                                                                                                  

Vital Signs:                                                                                      

13:59 Pulse 97; Resp 17; Temp 99.0; Pulse Ox 97% on R/A; Weight 52.16 kg; Pain 0/10;          sg  

14:00  / 71; Pulse 106; Resp 16 S; Pulse Ox 100% on R/A;                                aa5 

14:50  / 59; Pulse 99; Resp 16 S; Pulse Ox 100% on R/A;                                 aa5 

15:58  / 86; Pulse 101; Resp 18; Pulse Ox 99% on R/A;                                   ca1 

16:55  / 79; Pulse 108; Resp 19; Pulse Ox 99% on R/A;                                   ca1 

18:00  / 78; Pulse 98; Resp 19; Pulse Ox 100% on R/A;                                   ca1 

18:50  / 77; Pulse 99; Resp 19; Pulse Ox 100% on R/A;                                   ca1 

                                                                                                  

MDM:                                                                                              

14:57 Patient medically screened.                                                             ps1 

                                                                                                  

01/15                                                                                             

15:00 Order name: Basic Metabolic Panel                                                       aa5 

01/15                                                                                             

15:00 Order name: CBC with Diff                                                               aa5 

01/15                                                                                             

15:00 Order name: LFT's                                                                       aa5 

01/15                                                                                             

15:00 Order name: Magnesium                                                                   aa5 

01/15                                                                                             

15:00 Order name: NT PRO-BNP                                                                  aa5 

01/15                                                                                             

15:00 Order name: PT-INR                                                                      aa5 

01/15                                                                                             

15:00 Order name: Troponin (emerg Dept Use Only)                                              aa5 

01/15                                                                                             

15:08 Order name: Lactate                                                                     ps1 

01/15                                                                                             

15:08 Order name: Flu                                                                         [facility] 

01/15                                                                                             

15:14 Order name: TSH                                                                         [facility] 

01/15                                                                                             

15:15 Order name: CBC with Automated Diff; Complete Time: 16:03                               EDMS

01/15                                                                                             

15:18 Order name: Protime (+INR); Complete Time: 16:03                                        EDMS

01/15                                                                                             

15:38 Order name: Basic Metabolic Panel; Complete Time: 16:03                                 EDMS

01/15                                                                                             

15:38 Order name: Liver (Hepatic) Function; Complete Time: 16:03                              EDMS

01/15                                                                                             

15:00 Order name: XRAY Chest (1 view)                                                         aa 

01/15                                                                                             

15:28 Order name: RAD; Complete Time: 16:03                                                   EDMS

01/15                                                                                             

15:38 Order name: Troponin (Emerg Dept Use Only); Complete Time: 16:03                        EDMS

01/15                                                                                             

15:38 Order name: NT PRO-BNP; Complete Time: 16:03                                            EDMS

01/15                                                                                             

15:38 Order name: Magnesium; Complete Time: 16:03                                             EDMS

01/15                                                                                             

15:56 Order name: Influenza Screen (A ; Complete Time: 16:03                                  EDMS

01/15                                                                                             

15:58 Order name: Thyroid Stimulating Hormone; Complete Time: 16:03                           EDMS

01/15                                                                                             

15:59 Order name: Lactate; Complete Time: 16:03                                               EDMS

01/15                                                                                             

16:54 Order name: Urine Microscopic Only                                                        

01/15                                                                                             

16:55 Order name: Urine Dipstick--Ancillary (enter results)                                   ms  

01/15                                                                                             

17:13 Order name: Urine Microscopic Only; Complete Time: 17:23                                EDMS

01/15                                                                                             

17:43 Order name: Blood Culture Adult (2)                                                     [facility] 

01/15                                                                                             

18:23 Order name: Urine Dipstick-Ancillary; Complete Time: 18:27                              EDMS

01/15                                                                                             

18:56 Order name: Glucose, Ancillary Testing                                                  EDMS

01/15                                                                                             

15:00 Order name: EKG; Complete Time: 15:01                                                   aa5 

01/15                                                                                             

15:00 Order name: Cardiac monitoring; Complete Time: 15:01                                    aa5 

01/15                                                                                             

15:00 Order name: EKG - Nurse/Tech; Complete Time: 15:36                                      aa5 

01/15                                                                                             

15:00 Order name: IV Saline Lock; Complete Time: 15:01                                        aa5 

01/15                                                                                             

15:00 Order name: Labs collected and sent; Complete Time: 15:01                               aa5 

01/15                                                                                             

15:00 Order name: O2 Per Protocol; Complete Time: 15:01                                       aa5 

01/15                                                                                             

15:00 Order name: O2 Sat Monitoring; Complete Time: 15:                                     aa5 

01/15                                                                                             

15:08 Order name: Urine Dipstick-Ancillary (obtain specimen); Complete Time: 16:58            ps1 

                                                                                                  

Administered Medications:                                                                         

18:00 Drug: NS 0.9% 1000 ml Route: IV; Rate: 1 bolus; Site: right antecubital;                ca1 

18:52 Follow up: Response: No adverse reaction; IV Status: Completed infusion                 ca1 

18:38 Drug: Rocephin - (cefTRIAXone) 1 grams Route: IVPB; Infused Over: 30 mins; Site: right  ca1 

      antecubital;                                                                                

18:53 Follow up: Response: No adverse reaction; Administered IVP per pharmacy protocol.       ca1 

18:53 Follow up: IV Status: Completed infusion                                                ca1 

                                                                                                  

                                                                                                  

Point of Care Testing:                                                                            

      Blood Glucose:                                                                              

14:50 Blood Glucose: 110 mg/dL;                                                               aa5 

      Ranges:                                                                                     

      Critical Glucose Levels:Adult <50 mg/dl or >400 mg/dl  <40 mg/dl or >180 mg/dl       

Disposition:                                                                                      

01/15/19 18:30 Discharged to Home. Impression: Fatigue, Leukocytosis.                             

- Condition is Stable.                                                                            

- Discharge Instructions: Fatigue.                                                                

- Prescriptions for Keflex 500 mg Oral Capsule - take 1 capsule by ORAL route every 8             

  hours for 10 days; 30 capsule.                                                                  

- Medication Reconciliation Form, Thank You Letter, Antibiotic Education, Prescription            

  Opioid Use form.                                                                                

- Follow up: DANA COLES; When: As needed; Reason: Recheck today's complaints,                   

  Continuance of care, Re-evaluation by your physician. Follow up: Emergency                      

  Department; When: As needed; Reason: Fever > 102 F, Worsening of condition.                     

- Problem is an ongoing problem.                                                                  

- Symptoms have improved.                                                                         

                                                                                                  

                                                                                                  

                                                                                                  

Signatures:                                                                                       

Dispatcher [employer]                           EDMS                                                 

Lobito Ga RN                         RN   sg                                                   

Kyung Martinez RN                     RN   aa5                                                  

Gregory Johnson MD MD   ps1                                                  

Zoë Mustafa RN                        RN   ca1                                                  

                                                                                                  

Corrections: (The following items were deleted from the chart)                                    

19:11 18:30 01/15/2019 18:30 Discharged to Home. Impression: Fatigue; Leukocytosis. Condition ca1 

      is Stable. Forms are Medication Reconciliation Form, Thank You Letter, Antibiotic           

      Education, Prescription Opioid Use. Follow up: DANA COLES; When: As needed; Reason:        

      Recheck today's complaints, Continuance of care, Re-evaluation by your physician.           

      Follow up: Emergency Department; When: As needed; Reason: Fever > 102 F, Worsening of       

      condition. Problem is an ongoing problem. Symptoms have improved. ps1                       

                                                                                                  

************************************************************************************************** Primary osteoarthritis of right knee

## 2019-02-13 NOTE — DISCHARGE NOTE ADULT - HOSPITAL COURSE
Medication(s) Requested:    Disp Refills Start End    ARIPiprazole (ABILIFY) 2 MG tablet 30 tablet 0 1/14/2019     Sig - Route: Take 1 tablet by mouth daily. - Oral    Sent to pharmacy as: ARIPiprazole 2 MG Oral Tablet    Class: Eprescribe    E-Prescribing Status: Receipt confirmed by pharmacy (1/14/2019  3:36 PM CST)       Disp Refills Start End    hydrOXYzine (ATARAX) 25 MG tablet 60 tablet 0 1/14/2019     Sig: Take 1 or 2 tablets daily as needed for anxiety    Sent to pharmacy as: HydrOXYzine HCl 25 MG Oral Tablet    Class: Eprescribe    E-Prescribing Status: Receipt confirmed by pharmacy (1/14/2019  3:36 PM CST)       Disp Refills Start End    OLANZapine (ZYPREXA) 10 MG tablet 30 tablet 0 1/14/2019     Sig: take 1/2 tablet by mouth twice a day    Sent to pharmacy as: OLANZapine 10 MG Oral Tablet    Class: Eprescribe    E-Prescribing Status: Receipt confirmed by pharmacy (1/14/2019  3:36 PM CST)       Disp Refills Start End    zolpidem (AMBIEN CR) 12.5 MG CR tablet 30 tablet 0 1/14/2019     Sig - Route: Take 1 tablet by mouth nightly as needed for Sleep. - Oral    Class: Normal    Notes to Pharmacy: MUST LAST 30 DAYS       Disp Refills Start End    LORazepam (ATIVAN) 1 MG tablet 90 tablet 2 11/19/2018     Sig: take 1/2 or 1 tablet by mouth 3 TIMES DAILY As Needed for acute anxiety]    Sent to pharmacy as: LORazepam 1 MG Oral Tablet    Class: Eprescribe    E-Prescribing Status: Receipt confirmed by pharmacy (11/19/2018 10:41 AM CST)              Last seen on [facility] 1K 01/04/2019  NOS x 1  Appointment: not scheduled.  Patient called, no answer, message left to call here (appointment needed).   Last refill: Zolpidem and Lorazepam 01/14/2019 per PDMP  Is the patient due for refill of this medication(s): Yes  PDMP review: Criteria not met because patient did not f/u as advised. Forwarded to Physician/STEFANIE for further review and decision on medication(s) requested.   Is it ok to refill with appointment reminder left with  the pharmacy? (according to chart -patient is at IOP today)       s/p elective right revision total knee replacement.

## 2019-02-21 NOTE — DISCHARGE NOTE ADULT - PHYSICIAN SECTION COMPLETE
PROCEDURE NOTE: Biopsy of Eyelid Right Upper Lid. Diagnosis: Eyelid Lesion, Benign. Prior to treatment, the risks/benefits/alternatives were discussed. The patient wished to proceed with procedure. The area of the surgical site was prepped surgical scrub. 0.5 cc of 2% lidocaine with epinephrine was injected beneath the lesion. The lesion was excised with Johnny scissors. The defect was cauterized. Erythromycin ointment was placed on the biopsy site. Patient tolerated procedure well. There were no complications. The lesion was placed in formalin and sent to a pathology lab. Post procedure instructions given. Juan Hardin
Yes

## 2019-07-31 PROCEDURE — C1713: CPT

## 2019-07-31 PROCEDURE — 73562 X-RAY EXAM OF KNEE 3: CPT

## 2019-07-31 PROCEDURE — 88311 DECALCIFY TISSUE: CPT

## 2019-07-31 PROCEDURE — 83036 HEMOGLOBIN GLYCOSYLATED A1C: CPT

## 2019-07-31 PROCEDURE — 89051 BODY FLUID CELL COUNT: CPT

## 2019-07-31 PROCEDURE — 86900 BLOOD TYPING SEROLOGIC ABO: CPT

## 2019-07-31 PROCEDURE — 85027 COMPLETE CBC AUTOMATED: CPT

## 2019-07-31 PROCEDURE — 97110 THERAPEUTIC EXERCISES: CPT

## 2019-07-31 PROCEDURE — 88331 PATH CONSLTJ SURG 1 BLK 1SPC: CPT

## 2019-07-31 PROCEDURE — 86850 RBC ANTIBODY SCREEN: CPT

## 2019-07-31 PROCEDURE — 87075 CULTR BACTERIA EXCEPT BLOOD: CPT

## 2019-07-31 PROCEDURE — C1776: CPT

## 2019-07-31 PROCEDURE — 97530 THERAPEUTIC ACTIVITIES: CPT

## 2019-07-31 PROCEDURE — 86901 BLOOD TYPING SEROLOGIC RH(D): CPT

## 2019-07-31 PROCEDURE — 80048 BASIC METABOLIC PNL TOTAL CA: CPT

## 2019-07-31 PROCEDURE — 97116 GAIT TRAINING THERAPY: CPT

## 2019-07-31 PROCEDURE — 97162 PT EVAL MOD COMPLEX 30 MIN: CPT

## 2019-07-31 PROCEDURE — 87205 SMEAR GRAM STAIN: CPT

## 2019-07-31 PROCEDURE — 87070 CULTURE OTHR SPECIMN AEROBIC: CPT

## 2019-07-31 PROCEDURE — 88305 TISSUE EXAM BY PATHOLOGIST: CPT

## 2020-04-16 NOTE — H&P PST ADULT - PRO ARRIVE FROM
How Severe Are Your Spot(S)?: mild
home
What Is The Reason For Today's Visit?: Full Body Skin Examination with No Concerns
What Is The Reason For Today's Visit? (Being Monitored For X): concerning skin lesions on an annual basis

## 2020-11-09 NOTE — PHYSICAL THERAPY INITIAL EVALUATION ADULT - PERSONAL SAFETY AND JUDGMENT, REHAB EVAL
November 9, 2020      Lani Calero MD  [address]   [address]  [address] [facility]  [address]  [facility] [address]  Phone: [phone]  Fax: [phone]          Patient: Kasey Lewis   MR Number: [id number]   YOB: 2020   Date of Visit: 2020       Dear Dr. Lani Calero:    Thank you for referring Kasey Lewis to me for evaluation. Attached you will find relevant portions of my assessment and plan of care.    If you have questions, please do not hesitate to call me. I look forward to following Kasey Lewis along with you.    Sincerely,    Blayne Lipscomb, GUERDA    Enclosure  CC:  No Recipients    If you would like to receive this communication electronically, please contact [email or web address] or [phone] to request more information on [employer] access.    For providers and/or their staff who would like to refer a patient to [facility], please contact us through our one-stop-shop provider referral line, [facility], at [phone].    If you feel you have received this communication in error or would no longer like to receive these types of communications, please e-mail [email or web address]          oral intact

## 2021-01-08 NOTE — H&P PST ADULT - IS PATIENT PREGNANT?
[Recent Weight Gain (___ Lbs)] : recent [unfilled] ~Ulb weight gain [Abdominal Pain] : abdominal pain [Diarrhea] : diarrhea [Breast Pain] : breast pain [Anxiety] : anxiety [Depression] : depression [Negative] : Heme/Lymph no

## 2021-04-14 NOTE — CONSULT NOTE ADULT - PROBLEM SELECTOR PROBLEM 4
S/P right knee surgery Implemented All Universal Safety Interventions:  [place] to call system. Call bell, personal items and telephone within reach. Instruct patient to call for assistance. Room bathroom lighting operational. Non-slip footwear when patient is off stretcher. Physically safe environment: no spills, clutter or unnecessary equipment. Stretcher in lowest position, wheels locked, appropriate side rails in place.

## 2021-08-10 NOTE — PHYSICAL THERAPY INITIAL EVALUATION ADULT - TRANSFER SKILLS, REHAB EVAL
independent/needs device Tazorac Counseling:  Patient advised that medication is irritating and drying.  Patient may need to apply sparingly and wash off after an hour before eventually leaving it on overnight.  The patient verbalized understanding of the proper use and possible adverse effects of tazorac.  All of the patient's questions and concerns were addressed.

## 2021-11-22 NOTE — PATIENT PROFILE ADULT. - FAMILY HISTORY
Mother  Still living? Yes, Estimated age: Age Unknown  Family history of breast cancer, Age at diagnosis: Age Unknown
Detail Level: Detailed
General Sunscreen Counseling: I recommended a broad spectrum sunscreen with a SPF of 30 or higher.  I explained that SPF 30 sunscreens block approximately 97 percent of the sun's harmful rays.  Sunscreens should be applied at least 15 minutes prior to expected sun exposure and then every 2 hours after that as long as sun exposure continues. If swimming or exercising sunscreen should be reapplied every 45 minutes to an hour after getting wet or sweating.  One ounce, or the equivalent of a shot glass full of sunscreen, is adequate to protect the skin not covered by a bathing suit. I also recommended a lip balm with a sunscreen as well. Sun protective clothing can be used in lieu of sunscreen but must be worn the entire time you are exposed to the sun's rays.

## 2022-04-06 RX ORDER — TRAMADOL HYDROCHLORIDE 50 MG/1
1 TABLET ORAL
Qty: 0 | Refills: 0 | DISCHARGE

## 2022-04-06 RX ORDER — FERROUS SULFATE 325(65) MG
1 TABLET ORAL
Qty: 0 | Refills: 0 | DISCHARGE

## 2022-04-06 RX ORDER — SENNA PLUS 8.6 MG/1
2 TABLET ORAL
Qty: 0 | Refills: 0 | DISCHARGE

## 2022-04-06 RX ORDER — LOSARTAN/HYDROCHLOROTHIAZIDE 100MG-25MG
1 TABLET ORAL
Qty: 0 | Refills: 0 | DISCHARGE

## 2022-04-06 RX ORDER — IBUPROFEN 200 MG
1 TABLET ORAL
Qty: 0 | Refills: 0 | DISCHARGE

## 2022-05-10 NOTE — H&P ADULT - NSHPSOCIALHISTORY_GEN_ALL_CORE
Never smoker, drinker or use of illicit substances pain not relieved, covering MD paged, report to LENORE Woodward, IV intact left AC

## 2022-12-19 NOTE — H&P PST ADULT - DOES PATIENT HAVE ADVANCE DIRECTIVE
in case of emergency call patient's son Abran, he is to make medical decisions about pt, HCP  form given to pt at PST/No Yes

## 2023-04-11 NOTE — H&P PST ADULT - REASON FOR ADMISSION
Painful right knee, "specially when standing from a sitting position", unable to flex and extend the right lower extremity Excision Method: Elliptical

## 2023-04-26 NOTE — PATIENT PROFILE ADULT. - VISION (WITH CORRECTIVE LENSES IF THE PATIENT USUALLY WEARS THEM):
postoperative care Normal vision: sees adequately in most situations; can see medication labels, newsprint

## 2023-09-21 ENCOUNTER — APPOINTMENT (OUTPATIENT)
Age: 58
End: 2023-09-21
Payer: COMMERCIAL

## 2023-09-21 VITALS
WEIGHT: 220 LBS | SYSTOLIC BLOOD PRESSURE: 155 MMHG | BODY MASS INDEX: 40.48 KG/M2 | DIASTOLIC BLOOD PRESSURE: 98 MMHG | HEART RATE: 64 BPM | HEIGHT: 62 IN

## 2023-09-21 DIAGNOSIS — M19.90 UNSPECIFIED OSTEOARTHRITIS, UNSPECIFIED SITE: ICD-10-CM

## 2023-09-21 DIAGNOSIS — K43.2 INCISIONAL HERNIA W/OUT OBSTRUCTION OR GANGRENE: ICD-10-CM

## 2023-09-21 DIAGNOSIS — K80.20 CALCULUS OF GALLBLADDER W/OUT CHOLECYSTITIS W/OUT OBSTRUCTION: ICD-10-CM

## 2023-09-21 DIAGNOSIS — F41.9 ANXIETY DISORDER, UNSPECIFIED: ICD-10-CM

## 2023-09-21 PROBLEM — N92.0 EXCESSIVE AND FREQUENT MENSTRUATION WITH REGULAR CYCLE: Chronic | Status: ACTIVE | Noted: 2017-07-26

## 2023-09-21 PROBLEM — Z96.651 PRESENCE OF RIGHT ARTIFICIAL KNEE JOINT: Chronic | Status: ACTIVE | Noted: 2018-06-19

## 2023-09-21 PROBLEM — E66.01 MORBID (SEVERE) OBESITY DUE TO EXCESS CALORIES: Chronic | Status: ACTIVE | Noted: 2017-07-26

## 2023-09-21 PROBLEM — M17.11 UNILATERAL PRIMARY OSTEOARTHRITIS, RIGHT KNEE: Chronic | Status: ACTIVE | Noted: 2017-07-26

## 2023-09-21 PROBLEM — I10 ESSENTIAL (PRIMARY) HYPERTENSION: Chronic | Status: ACTIVE | Noted: 2017-07-26

## 2023-09-21 PROBLEM — M25.661 STIFFNESS OF RIGHT KNEE, NOT ELSEWHERE CLASSIFIED: Chronic | Status: ACTIVE | Noted: 2018-06-19

## 2023-09-21 PROCEDURE — 99203 OFFICE O/P NEW LOW 30 MIN: CPT

## 2023-09-21 RX ORDER — NAPROXEN 500 MG/1
TABLET ORAL
Refills: 0 | Status: ACTIVE | COMMUNITY

## 2023-09-21 RX ORDER — AMLODIPINE BESYLATE 5 MG/1
TABLET ORAL
Refills: 0 | Status: ACTIVE | COMMUNITY

## 2023-10-11 ENCOUNTER — OUTPATIENT (OUTPATIENT)
Dept: OUTPATIENT SERVICES | Facility: HOSPITAL | Age: 58
LOS: 1 days | End: 2023-10-11
Payer: MEDICARE

## 2023-10-11 VITALS
DIASTOLIC BLOOD PRESSURE: 78 MMHG | RESPIRATION RATE: 16 BRPM | HEIGHT: 62 IN | OXYGEN SATURATION: 98 % | TEMPERATURE: 98 F | WEIGHT: 220.02 LBS | SYSTOLIC BLOOD PRESSURE: 132 MMHG | HEART RATE: 60 BPM

## 2023-10-11 DIAGNOSIS — Z90.710 ACQUIRED ABSENCE OF BOTH CERVIX AND UTERUS: Chronic | ICD-10-CM

## 2023-10-11 DIAGNOSIS — K83.2 PERFORATION OF BILE DUCT: ICD-10-CM

## 2023-10-11 DIAGNOSIS — Z01.818 ENCOUNTER FOR OTHER PREPROCEDURAL EXAMINATION: ICD-10-CM

## 2023-10-11 DIAGNOSIS — K80.20 CALCULUS OF GALLBLADDER WITHOUT CHOLECYSTITIS WITHOUT OBSTRUCTION: ICD-10-CM

## 2023-10-11 DIAGNOSIS — Z96.651 PRESENCE OF RIGHT ARTIFICIAL KNEE JOINT: Chronic | ICD-10-CM

## 2023-10-11 DIAGNOSIS — I10 ESSENTIAL (PRIMARY) HYPERTENSION: ICD-10-CM

## 2023-10-11 DIAGNOSIS — Z98.890 OTHER SPECIFIED POSTPROCEDURAL STATES: Chronic | ICD-10-CM

## 2023-10-11 DIAGNOSIS — Z98.891 HISTORY OF UTERINE SCAR FROM PREVIOUS SURGERY: Chronic | ICD-10-CM

## 2023-10-11 LAB — BLD GP AB SCN SERPL QL: SIGNIFICANT CHANGE UP

## 2023-10-11 PROCEDURE — G0463: CPT

## 2023-10-11 NOTE — H&P PST ADULT - ASSESSMENT
52 yo female with history of HTN, obesity, surgical hx of R knee replacement, x-sections, partial hysterectomy,  presents to [facility] due to upper mid abd pain for years. found to hae gall stone on ct scan, who is now scheduled to have robotic assisted laparoscopic cholecystomy and repair of incisional hernia  with mess in 10/18/2023.    stop bang 3

## 2023-10-11 NOTE — H&P PST ADULT - HISTORY OF PRESENT ILLNESS
52 yo female with history of HTN, obesity, surgical hx of R knee replacement, x-sections, partial hysterectomy,  presents to [facility] due to upper mid abd pain for years. found to hae gall stone on ct scan, who is now scheduled to have robotic assisted laparoscopic cholecystomy and repair of incisional hernia  with mess in 10/18/2023.

## 2023-10-11 NOTE — H&P PST ADULT - PROBLEM SELECTOR PLAN 1
scheduled to have robotic assisted laparoscopic cholecystomy and repair of incisional hernia  with mess in 10/18/2023.    pt takes naproxen PRN - advised to hold for at least 1 week prior to surgery     Preoperative instructions discussed and given to patient.  NPO after midnight the day before surgery.   Instructed to avoid aspirin and over the counter medications including vitamins and herbal medications one week before surgery.   Instructed to use chlorhexidine solution for three days, including the day of surgery  for pre-procedural skin preparation.   Copy of written instructions and chlorhexidine sheet was provided to patient.  Patient verbalized understanding.

## 2023-10-11 NOTE — H&P PST ADULT - NSICDXPASTMEDICALHX_GEN_ALL_CORE_FT
PAST MEDICAL HISTORY:  Hypertension     Menorrhagia with regular cycle     Morbid obesity BMI- 40    Presence of right artificial knee joint     Primary osteoarthritis of right knee     Stiffness of right knee, not elsewhere classified

## 2023-10-11 NOTE — H&P PST ADULT - NSICDXPASTSURGICALHX_GEN_ALL_CORE_FT
PAST SURGICAL HISTORY:  S/P arthroscopy of right knee ,     S/P  section     S/P hysterectomy     S/P TKR (total knee replacement), right

## 2023-10-11 NOTE — H&P PST ADULT - MUSCULOSKELETAL
bilat knee tender/ROM intact/no joint erythema/no joint warmth/no calf tenderness/decreased ROM due to pain/joint swelling/normal gait/strength 5/5 bilateral upper extremities details…

## 2023-10-17 ENCOUNTER — TRANSCRIPTION ENCOUNTER (OUTPATIENT)
Age: 58
End: 2023-10-17

## 2023-10-18 ENCOUNTER — APPOINTMENT (OUTPATIENT)
Dept: SURGERY | Facility: HOSPITAL | Age: 58
End: 2023-10-18

## 2023-10-18 ENCOUNTER — TRANSCRIPTION ENCOUNTER (OUTPATIENT)
Age: 58
End: 2023-10-18

## 2023-10-18 ENCOUNTER — OUTPATIENT (OUTPATIENT)
Dept: OUTPATIENT SERVICES | Facility: HOSPITAL | Age: 58
LOS: 1 days | End: 2023-10-18
Payer: MEDICARE

## 2023-10-18 ENCOUNTER — RESULT REVIEW (OUTPATIENT)
Age: 58
End: 2023-10-18

## 2023-10-18 VITALS
TEMPERATURE: 99 F | HEART RATE: 68 BPM | WEIGHT: 220.02 LBS | HEIGHT: 62 IN | OXYGEN SATURATION: 99 % | SYSTOLIC BLOOD PRESSURE: 129 MMHG | DIASTOLIC BLOOD PRESSURE: 78 MMHG | RESPIRATION RATE: 16 BRPM

## 2023-10-18 VITALS
SYSTOLIC BLOOD PRESSURE: 135 MMHG | RESPIRATION RATE: 15 BRPM | TEMPERATURE: 98 F | DIASTOLIC BLOOD PRESSURE: 69 MMHG | HEART RATE: 95 BPM

## 2023-10-18 DIAGNOSIS — Z98.891 HISTORY OF UTERINE SCAR FROM PREVIOUS SURGERY: Chronic | ICD-10-CM

## 2023-10-18 DIAGNOSIS — K80.20 CALCULUS OF GALLBLADDER WITHOUT CHOLECYSTITIS WITHOUT OBSTRUCTION: ICD-10-CM

## 2023-10-18 DIAGNOSIS — Z96.651 PRESENCE OF RIGHT ARTIFICIAL KNEE JOINT: Chronic | ICD-10-CM

## 2023-10-18 DIAGNOSIS — Z98.890 OTHER SPECIFIED POSTPROCEDURAL STATES: Chronic | ICD-10-CM

## 2023-10-18 DIAGNOSIS — K83.2 PERFORATION OF BILE DUCT: ICD-10-CM

## 2023-10-18 DIAGNOSIS — Z90.710 ACQUIRED ABSENCE OF BOTH CERVIX AND UTERUS: Chronic | ICD-10-CM

## 2023-10-18 LAB
BLD GP AB SCN SERPL QL: SIGNIFICANT CHANGE UP
BLD GP AB SCN SERPL QL: SIGNIFICANT CHANGE UP

## 2023-10-18 PROCEDURE — 88302 TISSUE EXAM BY PATHOLOGIST: CPT | Mod: 26

## 2023-10-18 PROCEDURE — 86901 BLOOD TYPING SEROLOGIC RH(D): CPT

## 2023-10-18 PROCEDURE — S2900 ROBOTIC SURGICAL SYSTEM: CPT

## 2023-10-18 PROCEDURE — 88302 TISSUE EXAM BY PATHOLOGIST: CPT

## 2023-10-18 PROCEDURE — 47562 LAPAROSCOPIC CHOLECYSTECTOMY: CPT | Mod: AS

## 2023-10-18 PROCEDURE — 47563 LAPARO CHOLECYSTECTOMY/GRAPH: CPT

## 2023-10-18 PROCEDURE — 86850 RBC ANTIBODY SCREEN: CPT

## 2023-10-18 PROCEDURE — 49593 RPR AA HRN 1ST 3-10 RDC: CPT | Mod: XS

## 2023-10-18 PROCEDURE — 36415 COLL VENOUS BLD VENIPUNCTURE: CPT

## 2023-10-18 PROCEDURE — 86900 BLOOD TYPING SEROLOGIC ABO: CPT

## 2023-10-18 PROCEDURE — S2900: CPT

## 2023-10-18 PROCEDURE — 88304 TISSUE EXAM BY PATHOLOGIST: CPT | Mod: 26

## 2023-10-18 PROCEDURE — C9776: CPT

## 2023-10-18 PROCEDURE — 88304 TISSUE EXAM BY PATHOLOGIST: CPT

## 2023-10-18 PROCEDURE — 47562 LAPAROSCOPIC CHOLECYSTECTOMY: CPT

## 2023-10-18 PROCEDURE — 49591 RPR AA HRN 1ST < 3 CM RDC: CPT | Mod: AS,59

## 2023-10-18 PROCEDURE — 49591 RPR AA HRN 1ST < 3 CM RDC: CPT | Mod: 59

## 2023-10-18 DEVICE — MESH HERNIA VENTRAL VENTRALIGHT ST ELLIPSE 4 X 6": Type: IMPLANTABLE DEVICE | Site: ABDOMINAL | Status: FUNCTIONAL

## 2023-10-18 DEVICE — LIGATING CLIPS WECK HEMOLOK POLYMER LARGE (PURPLE) 6: Type: IMPLANTABLE DEVICE | Site: ABDOMINAL | Status: FUNCTIONAL

## 2023-10-18 RX ORDER — FENTANYL CITRATE 50 UG/ML
25 INJECTION INTRAVENOUS
Refills: 0 | Status: DISCONTINUED | OUTPATIENT
Start: 2023-10-18 | End: 2023-10-18

## 2023-10-18 RX ORDER — FENTANYL CITRATE 50 UG/ML
50 INJECTION INTRAVENOUS
Refills: 0 | Status: DISCONTINUED | OUTPATIENT
Start: 2023-10-18 | End: 2023-10-18

## 2023-10-18 RX ORDER — AMLODIPINE BESYLATE 2.5 MG/1
1 TABLET ORAL
Qty: 0 | Refills: 0 | DISCHARGE

## 2023-10-18 RX ORDER — LOSARTAN/HYDROCHLOROTHIAZIDE 100MG-25MG
1 TABLET ORAL
Qty: 0 | Refills: 0 | DISCHARGE

## 2023-10-18 RX ORDER — ONDANSETRON 8 MG/1
4 TABLET, FILM COATED ORAL ONCE
Refills: 0 | Status: DISCONTINUED | OUTPATIENT
Start: 2023-10-18 | End: 2023-10-18

## 2023-10-18 RX ORDER — INDOCYANINE GREEN 25 MG
5 KIT INTRAVASCULAR; INTRAVENOUS ONCE
Refills: 0 | Status: COMPLETED | OUTPATIENT
Start: 2023-10-18 | End: 2023-10-18

## 2023-10-18 RX ADMIN — FENTANYL CITRATE 50 MICROGRAM(S): 50 INJECTION INTRAVENOUS at 12:31

## 2023-10-18 RX ADMIN — FENTANYL CITRATE 25 MICROGRAM(S): 50 INJECTION INTRAVENOUS at 13:43

## 2023-10-18 RX ADMIN — FENTANYL CITRATE 50 MICROGRAM(S): 50 INJECTION INTRAVENOUS at 13:01

## 2023-10-18 RX ADMIN — FENTANYL CITRATE 25 MICROGRAM(S): 50 INJECTION INTRAVENOUS at 13:28

## 2023-10-18 RX ADMIN — INDOCYANINE GREEN 5 MILLIGRAM(S): KIT INTRAVASCULAR; INTRAVENOUS at 08:03

## 2023-10-18 NOTE — ASU DISCHARGE PLAN (ADULT/PEDIATRIC) - CARE PROVIDER_API CALL
Brooke Winchester  Physician Assistant Services  [id number] [place] [id number]  Phone: [phone]  Fax: [phone]  Follow Up Time: 2 weeks

## 2023-10-18 NOTE — BRIEF OPERATIVE NOTE - NSICDXBRIEFPROCEDURE_GEN_ALL_CORE_FT
PROCEDURES:  Robot-assisted cholecystectomy 18-Oct-2023 12:00:45  Brooke Winchester  Cholecystectomy, laparoscopic, with umbilical hernia repair 18-Oct-2023 12:01:27  Brooke Winchester

## 2023-10-18 NOTE — ASU PATIENT PROFILE, ADULT - FALL HARM RISK - HARM RISK INTERVENTIONS

## 2023-10-18 NOTE — ASU DISCHARGE PLAN (ADULT/PEDIATRIC) - ASU DC SPECIAL INSTRUCTIONSFT
Please follow-up with your surgeon in 1 week. Drink plenty of fluids and rest as needed. Call for any fever over 101, nausea, vomiting, severe pain, no passing of gas or bowel movement.     DIET   You may resume your regular diet as normal.     SURGICAL SITES   Remove outer dressing and keep white steri-strips in place allowing them to fall off on their own. You may shower 48 hours post-operatively but do not bathe or soak in the water for 1-2 weeks; pat dry. If you notice any signs of surgical site infection (ie. redness, swelling, pain, pus drainage), please seek medical care immediately.     ACTIVITY Do not lift anything heavier than 10 pounds for 2 weeks and avoid strenuous activity for 4-6 weeks.    PAIN CONTROL   You may take Motrin 600mg (with food) or Tylenol 650mg as needed for mild pain. Stagger one medication 3 hours after the other for maximum pain control. Maximum daily dose of Tylenol should not exceed 4grams/day.

## 2023-10-18 NOTE — PHYSICAL THERAPY INITIAL EVALUATION ADULT - BED MOBILITY LIMITATIONS, REHAB EVAL
Do not drink alcohol at all    Until your abdominal pain resolves, do not eat or drink anything other than clear liquids    Follow-up with your primary care physician today    Come to the emergency room immediately if your abdominal pain worsens   decreased ability to use arms for pushing/pulling/decreased ability to use legs for bridging/pushing

## 2023-10-18 NOTE — BRIEF OPERATIVE NOTE - OPERATION/FINDINGS
stone filled gallbladder  umbilical hernia stone filled gallbladder  umbilical hernia  ADDENDUM    umbilical hernia was 4 cm in size stone filled gallbladder  umbilical hernia  ADDENDUM  umbilical hernia was 3 cm in size

## 2023-10-20 LAB
SURGICAL PATHOLOGY STUDY: SIGNIFICANT CHANGE UP
SURGICAL PATHOLOGY STUDY: SIGNIFICANT CHANGE UP

## 2023-10-26 ENCOUNTER — APPOINTMENT (OUTPATIENT)
Age: 58
End: 2023-10-26
Payer: COMMERCIAL

## 2023-10-26 PROCEDURE — 99024 POSTOP FOLLOW-UP VISIT: CPT

## 2024-05-31 NOTE — ASU PREOP CHECKLIST - PATIENT'S PERSONAL PROPERTY GIVEN TO
"Chief Complaint   Patient presents with    New Patient     Osteoarthritis of both knees, unspecified osteoarthritis type  Chronic pain of both knees  Psoriasis           Vitals:    05/31/24 1436   BP: 128/82   BP Location: Left arm   Patient Position: Sitting   Cuff Size: Adult Large   Pulse: 58   SpO2: 98%   Weight: 129.3 kg (285 lb)   Height: 1.753 m (5' 9\")       Body mass index is 42.09 kg/m .      FARHAT Pratt    " sister/family member

## (undated) DEVICE — PACK ROBOTIC

## (undated) DEVICE — NDL HYPO SAFE 22G X 1.5" (BLACK)

## (undated) DEVICE — Device

## (undated) DEVICE — XI SEAL UNIV 5- 8 MM

## (undated) DEVICE — XI OBTURATOR OPTICAL BLADELESS 8MM

## (undated) DEVICE — CLEANER FOR ELCTR TIP

## (undated) DEVICE — XI DRAPE ARM

## (undated) DEVICE — PACK GENERAL LAPAROSCOPY

## (undated) DEVICE — SUT MONOCRYL 4-0 27" PS-2 UNDYED

## (undated) DEVICE — SUT VICRYL 0 27" CT-2 UNDYED

## (undated) DEVICE — TROCAR ETHICON ENDOPATH XCEL BLADELESS 12MM X 100MM STABILITY

## (undated) DEVICE — TUBING STRYKEFLOW II SUCTION / IRRIGATOR

## (undated) DEVICE — TROCAR SURGIQUEST AIRSEAL 12MMX100MM

## (undated) DEVICE — DRAPE TOWEL BLUE STICKY

## (undated) DEVICE — ELCTR BOVIE BLADE 3/4" EXTENDED LENGTH 6"

## (undated) DEVICE — SUT VLOC 180 2-0 6" GS-22 GREEN

## (undated) DEVICE — D HELP - CLEARVIEW CLEARIFY SYSTEM

## (undated) DEVICE — DRAPE 3/4 SHEET 52X76"

## (undated) DEVICE — ENDOCATCH 10MM SPECIMEN POUCH

## (undated) DEVICE — XI ARM PERMANENT CAUTERY HOOK

## (undated) DEVICE — GLV 7.5 PROTEXIS (WHITE)

## (undated) DEVICE — TUBING CONNECTING 6MM 20FT

## (undated) DEVICE — ELCTR CORD FOOTSWITCH 1PLR LAPSCP 10FT

## (undated) DEVICE — SUT POLYSORB 0 30" GU-46

## (undated) DEVICE — GLV 7 PROTEXIS (WHITE)

## (undated) DEVICE — XI DRAPE COLUMN

## (undated) DEVICE — DRAPE XL SHEET 77X98"